# Patient Record
Sex: FEMALE | Race: BLACK OR AFRICAN AMERICAN | Employment: PART TIME | URBAN - METROPOLITAN AREA
[De-identification: names, ages, dates, MRNs, and addresses within clinical notes are randomized per-mention and may not be internally consistent; named-entity substitution may affect disease eponyms.]

---

## 2023-07-06 ENCOUNTER — HOSPITAL ENCOUNTER (OUTPATIENT)
Age: 68
Setting detail: SPECIMEN
Discharge: HOME | End: 2023-07-06

## 2023-07-06 LAB
BILIRUB UR QL STRIP: NEGATIVE
CASTS #/AREA URNS LPF: NORMAL /LPF (ref 0–2)
CASTS #/AREA URNS LPF: NORMAL /LPF (ref 0–2)
CLARITY UR: ABNORMAL
COLOR UR: ABNORMAL
EPI CELLS #/AREA URNS HPF: NORMAL /HPF (ref 0–5)
GLUCOSE UR STRIP-MCNC: NEGATIVE MG/DL
HGB UR QL STRIP.AUTO: ABNORMAL
KETONES UR STRIP-MCNC: NEGATIVE MG/DL
LEUKOCYTE ESTERASE UR QL STRIP: ABNORMAL
NITRITE UR QL STRIP: POSITIVE
PH UR STRIP: 5 [PH] (ref 5–8)
PROT UR STRIP-MCNC: ABNORMAL MG/DL
RBC #/AREA URNS HPF: NORMAL /HPF (ref 0–2)
SP GR UR STRIP: 1.02 (ref 1–1.03)
UROBILINOGEN UR STRIP-ACNC: NORMAL
WBC #/AREA URNS HPF: NORMAL /HPF (ref 0–5)

## 2023-07-07 LAB
MICROORGANISM SPEC CULT: NORMAL
SPECIMEN DESCRIPTION: NORMAL

## 2023-07-12 ENCOUNTER — HOSPITAL ENCOUNTER (OUTPATIENT)
Age: 68
Setting detail: SPECIMEN
Discharge: HOME | End: 2023-07-12

## 2023-07-12 LAB
25(OH)D3 SERPL-MCNC: 13 NG/ML
ALBUMIN SERPL-MCNC: 4.2 G/DL (ref 3.5–5.2)
ALBUMIN/GLOB SERPL: 1.6 {RATIO} (ref 1–2.5)
ALP SERPL-CCNC: 101 U/L (ref 35–104)
ALT SERPL-CCNC: 9 U/L (ref 5–33)
ANION GAP SERPL CALCULATED.3IONS-SCNC: 10 MMOL/L (ref 9–17)
AST SERPL-CCNC: 15 U/L
BASOPHILS # BLD: 0.06 K/UL (ref 0–0.2)
BASOPHILS NFR BLD: 1 % (ref 0–2)
BILIRUB SERPL-MCNC: 0.3 MG/DL (ref 0.3–1.2)
BUN SERPL-MCNC: 27 MG/DL (ref 8–23)
CALCIUM SERPL-MCNC: 9.6 MG/DL (ref 8.6–10.4)
CHLORIDE SERPL-SCNC: 109 MMOL/L (ref 98–107)
CHOLEST SERPL-MCNC: 149 MG/DL
CHOLESTEROL/HDL RATIO: 3.5
CO2 SERPL-SCNC: 23 MMOL/L (ref 20–31)
CREAT SERPL-MCNC: 1.2 MG/DL (ref 0.5–0.9)
EOSINOPHIL # BLD: 0.14 K/UL (ref 0–0.44)
EOSINOPHILS RELATIVE PERCENT: 1 % (ref 1–4)
ERYTHROCYTE [DISTWIDTH] IN BLOOD BY AUTOMATED COUNT: 14 % (ref 11.8–14.4)
FOLATE SERPL-MCNC: 11.8 NG/ML
GFR SERPL CREATININE-BSD FRML MDRD: 50 ML/MIN/1.73M2
GLUCOSE SERPL-MCNC: 86 MG/DL (ref 70–99)
HCT VFR BLD AUTO: 42.7 % (ref 36.3–47.1)
HDLC SERPL-MCNC: 43 MG/DL
HGB BLD-MCNC: 13 G/DL (ref 11.9–15.1)
IMM GRANULOCYTES # BLD AUTO: 0.03 K/UL (ref 0–0.3)
IMM GRANULOCYTES NFR BLD: 0 %
LDLC SERPL CALC-MCNC: 77 MG/DL (ref 0–130)
LYMPHOCYTES # BLD: 21 % (ref 24–43)
LYMPHOCYTES NFR BLD: 2.33 K/UL (ref 1.1–3.7)
MAGNESIUM SERPL-MCNC: 2.2 MG/DL (ref 1.6–2.6)
MCH RBC QN AUTO: 29.5 PG (ref 25.2–33.5)
MCHC RBC AUTO-ENTMCNC: 30.4 G/DL (ref 28.4–34.8)
MCV RBC AUTO: 97 FL (ref 82.6–102.9)
MONOCYTES NFR BLD: 0.78 K/UL (ref 0.1–1.2)
MONOCYTES NFR BLD: 7 % (ref 3–12)
NEUTROPHILS NFR BLD: 70 % (ref 36–65)
NEUTS SEG NFR BLD: 7.63 K/UL (ref 1.5–8.1)
NRBC BLD-RTO: 0 PER 100 WBC
PLATELET # BLD AUTO: 197 K/UL (ref 138–453)
PMV BLD AUTO: 11.4 FL (ref 8.1–13.5)
POTASSIUM SERPL-SCNC: 4.8 MMOL/L (ref 3.7–5.3)
PROT SERPL-MCNC: 6.9 G/DL (ref 6.4–8.3)
RBC # BLD AUTO: 4.4 M/UL (ref 3.95–5.11)
SODIUM SERPL-SCNC: 142 MMOL/L (ref 135–144)
TRIGL SERPL-MCNC: 145 MG/DL
TSH SERPL DL<=0.05 MIU/L-ACNC: 0.52 UIU/ML (ref 0.3–5)
VIT B12 SERPL-MCNC: 457 PG/ML (ref 232–1245)
WBC OTHER # BLD: 11 K/UL (ref 3.5–11.3)

## 2023-08-29 ENCOUNTER — TELEPHONE (OUTPATIENT)
Dept: NEPHROLOGY | Age: 68
End: 2023-08-29

## 2023-08-29 ENCOUNTER — OFFICE VISIT (OUTPATIENT)
Dept: NEPHROLOGY | Age: 68
End: 2023-08-29
Payer: MEDICARE

## 2023-08-29 VITALS
OXYGEN SATURATION: 94 % | SYSTOLIC BLOOD PRESSURE: 124 MMHG | DIASTOLIC BLOOD PRESSURE: 76 MMHG | HEART RATE: 70 BPM | WEIGHT: 202 LBS | BODY MASS INDEX: 32.47 KG/M2 | HEIGHT: 66 IN

## 2023-08-29 DIAGNOSIS — N28.89 RIGHT RENAL MASS: ICD-10-CM

## 2023-08-29 DIAGNOSIS — R93.41 ABNORMAL RADIOLOGIC FINDINGS ON DIAGNOSTIC IMAGING OF RENAL PELVIS, URETER, OR BLADDER: ICD-10-CM

## 2023-08-29 DIAGNOSIS — N18.31 STAGE 3A CHRONIC KIDNEY DISEASE (HCC): Primary | ICD-10-CM

## 2023-08-29 DIAGNOSIS — I10 PRIMARY HYPERTENSION: ICD-10-CM

## 2023-08-29 DIAGNOSIS — N31.2 ATONIC BLADDER: ICD-10-CM

## 2023-08-29 PROBLEM — M19.90 ARTHRITIS: Status: ACTIVE | Noted: 2019-01-29

## 2023-08-29 PROBLEM — N30.10 CHRONIC INTERSTITIAL CYSTITIS: Status: ACTIVE | Noted: 2023-08-29

## 2023-08-29 PROBLEM — R40.0 DAYTIME SOMNOLENCE: Status: ACTIVE | Noted: 2023-08-29

## 2023-08-29 PROBLEM — E66.9 OBESITY WITH BODY MASS INDEX 30 OR GREATER: Status: ACTIVE | Noted: 2023-08-29

## 2023-08-29 PROBLEM — M79.10 MUSCLE PAIN: Status: ACTIVE | Noted: 2023-08-29

## 2023-08-29 PROBLEM — F17.200 TOBACCO DEPENDENCE SYNDROME: Status: ACTIVE | Noted: 2022-02-02

## 2023-08-29 PROBLEM — R09.81 NASAL CONGESTION: Status: ACTIVE | Noted: 2023-08-29

## 2023-08-29 PROBLEM — N32.81 OVERACTIVE BLADDER: Status: ACTIVE | Noted: 2022-05-04

## 2023-08-29 PROBLEM — R52 GENERALIZED PAIN: Status: ACTIVE | Noted: 2020-12-08
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
## 2023-08-29 PROBLEM — R40.1 CLOUDED CONSCIOUSNESS: Status: ACTIVE | Noted: 2023-08-29

## 2023-08-29 PROBLEM — R61 NIGHT SWEATS: Status: ACTIVE | Noted: 2023-08-29

## 2023-08-29 PROBLEM — R07.9 CHEST PAIN: Status: ACTIVE | Noted: 2017-03-13

## 2023-08-29 PROBLEM — H54.7 WORSENING VISION: Status: ACTIVE | Noted: 2019-05-22

## 2023-08-29 PROBLEM — M54.50 LOW BACK PAIN: Status: ACTIVE | Noted: 2017-06-09

## 2023-08-29 PROBLEM — E66.9 SIMPLE OBESITY: Status: ACTIVE | Noted: 2019-03-01

## 2023-08-29 PROBLEM — K57.32 DIVERTICULITIS OF COLON: Status: ACTIVE | Noted: 2019-03-01

## 2023-08-29 PROBLEM — R51.9 MORNING HEADACHE: Status: ACTIVE | Noted: 2023-08-29

## 2023-08-29 PROBLEM — N62 HYPERTROPHY OF BREAST: Status: ACTIVE | Noted: 2021-04-09

## 2023-08-29 PROBLEM — R68.89 ITCHY EYES: Status: ACTIVE | Noted: 2019-05-22

## 2023-08-29 PROBLEM — G25.81 RESTLESS LEGS SYNDROME: Status: ACTIVE | Noted: 2023-08-29

## 2023-08-29 PROBLEM — M81.0 OSTEOPOROSIS: Status: ACTIVE | Noted: 2018-03-21

## 2023-08-29 PROBLEM — L25.9 CONTACT DERMATITIS: Status: ACTIVE | Noted: 2021-07-02

## 2023-08-29 PROBLEM — R68.83 CHILLS: Status: ACTIVE | Noted: 2020-12-08

## 2023-08-29 PROBLEM — N95.0 POSTMENOPAUSAL BLEEDING: Status: ACTIVE | Noted: 2020-09-09

## 2023-08-29 PROBLEM — D23.9: Status: ACTIVE | Noted: 2022-02-02

## 2023-08-29 PROBLEM — R87.610 ATYPICAL SQUAMOUS CELLS OF UNDETERMINED SIGNIFICANCE (ASCUS) ON PAPANICOLAOU SMEAR OF CERVIX: Status: ACTIVE | Noted: 2023-08-29

## 2023-08-29 PROBLEM — H65.90 SEROUS OTITIS MEDIA: Status: ACTIVE | Noted: 2021-03-10

## 2023-08-29 PROBLEM — L63.9 ALOPECIA AREATA, UNSPECIFIED: Status: ACTIVE | Noted: 2017-03-13

## 2023-08-29 PROBLEM — H61.20 IMPACTED CERUMEN: Status: ACTIVE | Noted: 2020-03-02

## 2023-08-29 PROBLEM — R44.2 HYPNAGOGIC HALLUCINATIONS: Status: ACTIVE | Noted: 2023-08-29

## 2023-08-29 PROBLEM — N93.9 ABNORMAL UTERINE BLEEDING: Status: ACTIVE | Noted: 2021-02-10

## 2023-08-29 PROBLEM — R06.83 SNORING: Status: ACTIVE | Noted: 2023-08-29

## 2023-08-29 PROBLEM — G47.30 SLEEP APNEA: Status: ACTIVE | Noted: 2021-10-13

## 2023-08-29 PROBLEM — G47.00 INSOMNIA: Status: ACTIVE | Noted: 2023-08-29

## 2023-08-29 PROBLEM — H57.9 ITCHY EYES: Status: ACTIVE | Noted: 2019-05-22

## 2023-08-29 PROBLEM — F45.8 PSYCHOGENIC DYSURIA: Status: ACTIVE | Noted: 2020-06-29

## 2023-08-29 PROBLEM — I25.2 HISTORY OF MYOCARDIAL INFARCTION: Status: ACTIVE | Noted: 2023-08-29

## 2023-08-29 PROBLEM — K11.7 XEROSTOMIA: Status: ACTIVE | Noted: 2023-08-29

## 2023-08-29 PROBLEM — F43.22 ADJUSTMENT DISORDER WITH ANXIOUS MOOD: Status: ACTIVE | Noted: 2017-06-07

## 2023-08-29 PROCEDURE — 1123F ACP DISCUSS/DSCN MKR DOCD: CPT | Performed by: INTERNAL MEDICINE

## 2023-08-29 PROCEDURE — 99204 OFFICE O/P NEW MOD 45 MIN: CPT | Performed by: INTERNAL MEDICINE

## 2023-08-29 PROCEDURE — 3078F DIAST BP <80 MM HG: CPT | Performed by: INTERNAL MEDICINE

## 2023-08-29 PROCEDURE — 3074F SYST BP LT 130 MM HG: CPT | Performed by: INTERNAL MEDICINE

## 2023-08-29 RX ORDER — OXYBUTYNIN CHLORIDE 5 MG/1
5 TABLET, EXTENDED RELEASE ORAL DAILY
COMMUNITY

## 2023-08-29 RX ORDER — ATORVASTATIN CALCIUM 40 MG/1
40 TABLET, FILM COATED ORAL DAILY
COMMUNITY

## 2023-08-29 NOTE — PATIENT INSTRUCTIONS
Drugs to Avoid with Chronic Kidney Disease    Non-steroidal anti-inflammatory drugs (NSAIDS)    Potential complication and side effects of NSAIDS include:  Kidney injury and worsening kidney function   Risk of stomach ulcer and intestinal bleeding  Fluid retention and edema  Increased Blood Pressure    Non-Steroidal Anti-Inflammatory Drugs    Celecoxib (Celebrex)  Choline and magnesium salicylates (CMT, Trisosal, Trilisate)  Choline salicylate (Arthropan)  Diclofenac (Voltaren, Arthrotec, Cataflam, Cambia, Voltaren-XR, Zipsor)  Diflunisal (Dolobid)  Etodolac (Lodine XL, Lodine)  Famotidine + Ibuprofen (Duexis)  Fenoprofen (Nalfon, Nalfon 200)  Furbiprofen (Asaid)  Ibuprofen (Advil, Motrin, Midol, Nuprin, Genpril, Dolgesic,Profen,, Vicoprofen,Combunox, Actiprofen, Addaprin, Caldolor, Haltran, Q-Profen,Ibren, Menadol, Rufen,Saleto-200, Union,Ultraprin, Uni-Pro,Wal-Profen)  Indomethacin (Indocin, Indomethegan, Indo-Melvi)   Ketoprofen (Orudis  KT, Oruvail, Actron)  Ketorolac (Toradol, Sprix)  Magnesium Sulfate (Arthritab, Eagle Select, Tee's pills, Elio, Mobidin, Mobogesic)  Meclofenamate Sodium (Ponstel)  Meloxicam (Mobic)  Naproxen (Aleve, Anaprox, Naprosyn, EC-Naprosyn, Naprelan, All Day Pain Relief, Aflaxen, Anaprox-DS, Midol Extended Relief, Naprelan,Prevacid NapraPac, Naprapac, Vimovo)  Nabumetone (Relafen)  Oxaprozin (Daypro)  Piroxicam (Feldene)  Rofecoxib (Vioxx)  Salsalate (Amigesic, Anaflex 750, Disalcid, Marthritic, Mono-gesic, Salflex, Salsitab)  Sodium salicylate (various generics)  Sulindac (Clinoril)  Tolmetin (Tolectin)  Valdecoxib (Bextra)  Mefenamic Acid (Ponstel)  Famotidine and Ibuprofen (Duexis) but not famotidine by itself  Meclofenamate (Meclomen)    Antibiotics  Bactrim  Gentamycin  Tobramycin  Vancomycin  Tetracycline  Macrobid    Other                  IV contrast dye

## 2023-08-29 NOTE — TELEPHONE ENCOUNTER
[facility]. Arrive at 9:45 am fast for 4 hours this is at [address] [address].

## 2023-08-29 NOTE — TELEPHONE ENCOUNTER
I left a message for [employer] about the information below and asked for a return call.

## 2023-08-29 NOTE — TELEPHONE ENCOUNTER
I spoke to [facility] for 12 min.she gave me another number to call for PA she said that Savanah Campbell does them. I have to call [phone].

## 2023-08-29 NOTE — PROGRESS NOTES
Patient stated after I reviewed her medications that she has not been taking anything.
moist.  Neck: no bruits or jvd noted **  Cardiovascular:  S1, S2 without murmur **  Respiratory: Clear with no wheezes,rhonchi or rales   Abdomen: soft,non tender,good bowel sound and no palpable mass**  Ext: No lower extremity edema  Psychiatric: mood and affect appropriate**  Musculoskeletal:  Rom, muscular strength intact        CNS: Very awake and very alert. Well-oriented. Normal speech. Good motor strength. No obvious focal deficit. Data:   Labs:  Lab Results   Component Value Date     07/12/2023     10/13/2021     (H) 07/12/2021    K 4.8 07/12/2023    K 4.5 10/13/2021    K 4.6 07/12/2021     (H) 07/12/2023    CO2 23 07/12/2023    CO2 23 10/13/2021    CO2 22 07/12/2021    CREATININE 1.2 (H) 07/12/2023    CREATININE 0.96 (H) 10/13/2021    CREATININE 1.01 (H) 07/12/2021    BUN 27 (H) 07/12/2023    BUN 16 10/13/2021    BUN 22 07/12/2021    GLUCOSE 86 07/12/2023    GLUCOSE 96 10/13/2021    GLUCOSE 94 07/12/2021    WBC 11.0 07/12/2023    WBC 9.9 10/13/2021    WBC 10.7 07/12/2021    HGB 13.0 07/12/2023    HGB 13.3 10/13/2021    HGB 12.2 07/12/2021    HCT 42.7 07/12/2023    HCT 45.6 10/13/2021    HCT 42.7 07/12/2021    MCV 97.0 07/12/2023     07/12/2023         Imaging:  CXR results: Diagnostic images reports reviewed         Thank you Dr. Cheryl Grant, APRN - NP for allowing us to participate in care of Yony Mcbride   **This report has been created using voice recognition software. It maycontain minor  errors which are inherent in voice recognition technology. **    Electronically signed by Anjum Tran MD on 8/29/2023 at 9:44 AM

## 2023-09-07 ENCOUNTER — OFFICE VISIT (OUTPATIENT)
Dept: NEPHROLOGY | Age: 68
End: 2023-09-07
Payer: MEDICARE

## 2023-09-07 ENCOUNTER — TELEPHONE (OUTPATIENT)
Dept: NEPHROLOGY | Age: 68
End: 2023-09-07

## 2023-09-07 VITALS
BODY MASS INDEX: 32.78 KG/M2 | WEIGHT: 204 LBS | SYSTOLIC BLOOD PRESSURE: 129 MMHG | HEIGHT: 66 IN | HEART RATE: 79 BPM | DIASTOLIC BLOOD PRESSURE: 73 MMHG | OXYGEN SATURATION: 95 %

## 2023-09-07 DIAGNOSIS — N28.89 LEFT RENAL MASS: ICD-10-CM

## 2023-09-07 DIAGNOSIS — N18.31 STAGE 3A CHRONIC KIDNEY DISEASE (HCC): Primary | ICD-10-CM

## 2023-09-07 DIAGNOSIS — N31.2 ATONIC BLADDER: ICD-10-CM

## 2023-09-07 DIAGNOSIS — I10 PRIMARY HYPERTENSION: ICD-10-CM

## 2023-09-07 PROCEDURE — 3074F SYST BP LT 130 MM HG: CPT | Performed by: INTERNAL MEDICINE

## 2023-09-07 PROCEDURE — 1123F ACP DISCUSS/DSCN MKR DOCD: CPT | Performed by: INTERNAL MEDICINE

## 2023-09-07 PROCEDURE — 99214 OFFICE O/P EST MOD 30 MIN: CPT | Performed by: INTERNAL MEDICINE

## 2023-09-07 PROCEDURE — 3078F DIAST BP <80 MM HG: CPT | Performed by: INTERNAL MEDICINE

## 2023-09-07 NOTE — TELEPHONE ENCOUNTER
----- Message from Sabiha Smith MD sent at 9/7/2023 12:49 PM EDT -----  Need to see the patient sooner
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
## 2023-09-07 NOTE — TELEPHONE ENCOUNTER
Pt called asking if her urology new patient appointment has been scheduled. I informed her that I faxed the referral. Lissa Lynn the phone number to call Urology. [address] [place] [address] Urology and spoke with Cookie Sims. Dr. Jesus Balbuena would like Jeffory Gitelman seen ASAP due to a right renal mass. Cookie Sims requests we fax a referral and assured me they will get her scheduled as soon as they receive it. I requested on the referral that they let us know which Doctor she is scheduled with so that Dr. Jesus Balbuena may speak with them prior to [facility] appointment.    Referral faxed to [phone]

## 2023-09-13 NOTE — TELEPHONE ENCOUNTER
I called the urology office to get [employer] scheduled ASAP. I had to hang up. If they call back please let them know she needs seen ASAP and the doctor that is going to see you Dr. Ike Wick would like to speak to before she is seen. So please give them Dr. Figueroa Room cell number.

## 2023-09-14 ENCOUNTER — OFFICE VISIT (OUTPATIENT)
Dept: UROLOGY | Age: 68
End: 2023-09-14
Payer: MEDICARE

## 2023-09-14 VITALS
SYSTOLIC BLOOD PRESSURE: 130 MMHG | WEIGHT: 203.6 LBS | HEIGHT: 66 IN | BODY MASS INDEX: 32.72 KG/M2 | DIASTOLIC BLOOD PRESSURE: 82 MMHG

## 2023-09-14 DIAGNOSIS — N28.89 LEFT RENAL MASS: Primary | ICD-10-CM

## 2023-09-14 DIAGNOSIS — N32.81 OAB (OVERACTIVE BLADDER): ICD-10-CM

## 2023-09-14 PROCEDURE — 1123F ACP DISCUSS/DSCN MKR DOCD: CPT | Performed by: UROLOGY

## 2023-09-14 PROCEDURE — 99204 OFFICE O/P NEW MOD 45 MIN: CPT | Performed by: UROLOGY

## 2023-09-14 PROCEDURE — 3079F DIAST BP 80-89 MM HG: CPT | Performed by: UROLOGY

## 2023-09-14 PROCEDURE — 3075F SYST BP GE 130 - 139MM HG: CPT | Performed by: UROLOGY

## 2023-09-14 NOTE — PROGRESS NOTES
Eliel Nugent MD.    [address]  [address] [address]  Dept: [phone]  Dept Fax: [phone]  Loc: [facility] Urology Office Note -     Patient:  Moisés FIERRO  YOB: 1955    The patient is a 80 y.o. female who presents today for evaluation of the following problems:   Chief Complaint   Patient presents with    New Patient     Left renal mass    referred/consultation requested by MYKEL Santiago NP. History of Present Illness:    Left renal mass  Reviewed MRI and CT report  No images seen  4.5 cm enhancing renal mass    OAB  On ditropan  stable      Requested/reviewed records from MYKEL Santiago NP office and/or outside physician/EMR    (Patient's old records have been requested, reviewed and pertinent findings summarized in today's note.)    Procedures Today: N/A      Last several PSA's:  No results found for: \"PSA\"    Last total testosterone:  No results found for: \"TESTOSTERONE\"    Urinalysis today:  No results found for this visit on 09/14/23. Last BUN and creatinine:  Lab Results   Component Value Date    BUN 27 (H) 07/12/2023     Lab Results   Component Value Date    CREATININE 1.2 (H) 07/12/2023         Imaging Reviewed during this Office Visit:   Jessica Noyola MD independently reviewed the images and verified the radiology reports from:      MRI    Arising from the lateral and     inferior left kidney, there is a lobular solid mass measuring 3.8 x 3.3 x     4.6 cm (AP by transverse by craniocaudal). The mass is not significantly     changed when compared side by side.       PAST MEDICAL, FAMILY AND SOCIAL HISTORY:  Past Medical History:   Diagnosis Date    Arthritis     Chest pain     GERD (gastroesophageal reflux disease)     Hypertension     Kidney stones     Migraine     migraines     Past Surgical History:   Procedure Laterality Date

## 2023-10-05 ENCOUNTER — OFFICE VISIT (OUTPATIENT)
Dept: UROLOGY | Age: 68
End: 2023-10-05
Payer: MEDICARE

## 2023-10-05 VITALS — RESPIRATION RATE: 16 BRPM | HEIGHT: 66 IN | BODY MASS INDEX: 32.62 KG/M2 | WEIGHT: 203 LBS

## 2023-10-05 DIAGNOSIS — N32.81 OAB (OVERACTIVE BLADDER): ICD-10-CM

## 2023-10-05 DIAGNOSIS — N28.89 LEFT RENAL MASS: Primary | ICD-10-CM

## 2023-10-05 PROCEDURE — 99215 OFFICE O/P EST HI 40 MIN: CPT | Performed by: UROLOGY

## 2023-10-05 PROCEDURE — 1123F ACP DISCUSS/DSCN MKR DOCD: CPT | Performed by: UROLOGY

## 2023-10-05 NOTE — PROGRESS NOTES
outcomes and complications of the above options. Discussed 20% chance of benign disease  I answered all the patient's questions. For partial nephrectomy, I discussed risk of bleeding, conversion to radical nephrectomy or conversion to open approach. The patient would like to proceed with surgery. Will arrange for Robotic partial LEFT nephrectomy, possible radical, possible open. Oab- stable. Cont ditropan          Prescriptions Ordered:  No orders of the defined types were placed in this encounter. Orders Placed:  No orders of the defined types were placed in this encounter.            James Seals MD

## 2023-10-06 ENCOUNTER — HOSPITAL ENCOUNTER (OUTPATIENT)
Dept: MRI IMAGING | Age: 68
Discharge: HOME | End: 2023-10-06
Attending: RADIOLOGY

## 2023-10-06 ENCOUNTER — HOSPITAL ENCOUNTER (OUTPATIENT)
Dept: ULTRASOUND IMAGING | Age: 68
Discharge: HOME | End: 2023-10-06
Attending: RADIOLOGY

## 2023-10-06 ENCOUNTER — HOSPITAL ENCOUNTER (OUTPATIENT)
Dept: CT IMAGING | Age: 68
Discharge: HOME | End: 2023-10-06
Attending: RADIOLOGY

## 2023-10-06 DIAGNOSIS — Z00.6 EXAMINATION FOR NORMAL COMPARISON FOR CLINICAL RESEARCH: ICD-10-CM

## 2023-10-11 ENCOUNTER — TELEMEDICINE (OUTPATIENT)
Dept: UROLOGY | Age: 68
End: 2023-10-11
Payer: MEDICARE

## 2023-10-11 DIAGNOSIS — N32.81 OAB (OVERACTIVE BLADDER): ICD-10-CM

## 2023-10-11 DIAGNOSIS — N28.89 LEFT RENAL MASS: Primary | ICD-10-CM

## 2023-10-11 PROCEDURE — 99443 PR PHYS/QHP TELEPHONE EVALUATION 21-30 MIN: CPT | Performed by: UROLOGY

## 2023-10-11 NOTE — PROGRESS NOTES
[address] [place] [address]  Dept: [phone]  Dept Fax: [phone]  Loc: [facility] Urology Office Note -     Patient:  Harjeet FIERRO  YOB: 1955    The patient is a 80 y.o. female who presents today for evaluation of the following problems:   Chief Complaint   Patient presents with    Other     Left renal mass. Surgery and imaging discussion    referred/consultation requested by MYKEL Buck NP. History of Present Illness:    Left renal mass  Reviewed MRI and CT report  No images seen  4.5 cm enhancing renal mass  Possibly seen in 2020    OAB  On ditropan  stable      Requested/reviewed records from Gwendolyn Alfred APRN - NP office and/or outside physician/EMR    (Patient's old records have been requested, reviewed and pertinent findings summarized in today's note.)    Procedures Today: N/A      Last several PSA's:  No results found for: \"PSA\"    Last total testosterone:  No results found for: \"TESTOSTERONE\"    Urinalysis today:  No results found for this visit on 10/11/23. Last BUN and creatinine:  Lab Results   Component Value Date    BUN 27 (H) 07/12/2023     Lab Results   Component Value Date    CREATININE 1.2 (H) 07/12/2023         Imaging Reviewed during this Office Visit:   Shantel Del Valle MD independently reviewed the images and verified the radiology reports from:      MRI    Arising from the lateral and     inferior left kidney, there is a lobular solid mass measuring 3.8 x 3.3 x     4.6 cm (AP by transverse by craniocaudal). The mass is not significantly     changed when compared side by side.       PAST MEDICAL, FAMILY AND SOCIAL HISTORY:  Past Medical History:   Diagnosis Date    Arthritis     Chest pain     GERD (gastroesophageal reflux disease)     Hypertension     Kidney stones     Migraine     migraines     Past Surgical

## 2023-10-13 ENCOUNTER — TELEPHONE (OUTPATIENT)
Dept: UROLOGY | Age: 68
End: 2023-10-13

## 2023-10-13 DIAGNOSIS — Z01.818 PRE-OP TESTING: ICD-10-CM

## 2023-10-13 DIAGNOSIS — N28.89 LEFT RENAL MASS: Primary | ICD-10-CM

## 2023-10-13 NOTE — TELEPHONE ENCOUNTER
DO NOT TAKE  FISH OIL, MOBIC, IBUPROFEN, MOTRIN-LIKE DRUGS AND ANY MULTIVITAMINS OR OVER THE COUNTER SUPPLEMENTS 14 DAYS PRIOR TO SURGERY. HOLD ASPIRIN 5 DAYS PRIOR TO SURGERY    IF YOU TAKE GLUCOPHAGE, METFORMIN OR JANUMET, HOLD 2 DAYS PRIOR TO SURGERY    MUST HAVE AN ADULT OVER THE AGE OF 18 WITH YOU AT THE TIME OF THE DISCHARGE AND WITH YOU AT HOME AFTER THE PROCEDURE FOR 24 HOURS        Martinezmendoza [facility] 1955     Surgical Physician: Dr. Leslie Quintanilla have been scheduled for the procedure marked below:      Surgery: Robotic Assisted Laparoscopic Left Partial Nephrectomy, Possible Open, Possible Radical          Date: 11/9/23     Anesthesia: Anesthesiologist (General/Spinal)     Place of Service: [facility]         Arrive to same day surgery at:  6:00 am  (Surgery time is subject to change)      INSTRUCTIONS AS MARKED BELOW:    1.  DO NOT eat or drink anything after midnight before surgery. 2.  We prefer you shower or bathe with an antibacterial soap (Dial) the morning of surgery. 3.  Plan to spend the night at the hospital the day of surgery. 4  Please bring a current medication list, photo ID and insurance card(s) with you  5. Okay to take Tylenol  6. Take blood pressure or heart medication as directed, if taken in the morning take with a small sip of water  7. Start the bowel prep the day before surgery on 11/8/23. Instructions included  8. The office will call you in 1-2 days after your procedure to schedule a follow up.  [address] [place] [address] JOSSELIN will assist with your Robotic surgery    DATE SENSITIVE TESTING-DO ON THE DATE LISTED *WALK IN *NO APPOINTMENT    DO THE PRE OP URINE CULTURE AND FASTING LABS ON 10/30/23.  ORDERS INCLUDED AND IN EPIC        Date: 10/13/2023

## 2023-10-13 NOTE — TELEPHONE ENCOUNTER
[address] [place] [address] Urology  [address]  [facility], [address] [place] [address]  [phone]    START BOWEL PREP ON 11/8/23    Surgery Bowel Preparation    Purchase a 4.1 oz bottle of Miralax at your pharmacy which will be in powder form. Follow the instructions as directed for mixing with water and drink the entire bottle, Take 4 Dulcolax tablets the afternoon before your scheduled surgery. Start drinking approximately 2:00pm.       Start a clear liquid diet (for dinner) the evening before surgery. You may have the following:   Water   Apple, grape or cranberry juice   Clear, fat free broth   Clear sodas (7-up, Sprite)   Plain gelatin (Jell-o)   Popsicles without bits of fruit or fruit pulp   Tea or coffee without milk or cream    Nothing to eat or drink after midnight before your scheduled surgery. Do a fleets enema the night prior to your surgery between 7:00pm and 8:00pm    Please contact our office at [phone] if you have any questions.

## 2023-10-13 NOTE — TELEPHONE ENCOUNTER
Patient is scheduled for surgery with  on 11/9/23. Surgery consent to be done on arrival. Dr. Efrain Lyons to clear. Patient to do pre op urine culture and fasting labs on 10/30/23. Surgery instructions gone over with patient verbally in the office or mailed to the patient. Patient informed an adult over the age of 25 must be with them at the time of surgery, upon discharge and at home for 24 hours after the procedure.

## 2023-10-13 NOTE — TELEPHONE ENCOUNTER
Robotic Surgery Scheduling Form   [facility] [address] [facility]    Phone * [phone]   Surgical Scheduling Direct line Phone * [phone]  Fax * [phone]      Cale MCLEODLuanaBETINA      1955    female    [address]  [facility] [address]  Marital Status:          Home Phone: [phone]   Cell Phone:   Telephone Information:   Mobile [phone]              Surgeon: Dr. Christopher Appiah            Surgery Date:11/9/23           Time: 7:30 am     Procedure: Robotic Assisted Lapar scopic Left Partial Nephrectomy, Possible Radical, Possible Open        Outpatient/OBS     Diagnosis: Left Renal Mass    Important Medical History: IN EPIC    Special Inst/Equip: XI    CPT Codes: 34906    Latex Allergy:   Yes Cardiac Device:  No    Case Location:  Main OR     Preadmission Testing:  Phone Call    PAT Date and Time: ________________________________    PAT Confirmation #: _________________________________    Post Op Visit:  ______________________________________    Need Preop Cardiac Clearance:   Yes    Does patient have Cardiologist/physician?   Dr Angie Kevin #:  ______________________________________________    Odilia Raleigh: __________________________________ Date:____________________    Firefly:  No    Dual Console:  No   Ultrasound:  No    Single Site: No    RNFA (colon resection only):  Assisting Surgeon: [address] Name:  [facility]

## 2023-10-19 ENCOUNTER — TELEPHONE (OUTPATIENT)
Dept: UROLOGY | Age: 68
End: 2023-10-19

## 2023-10-19 DIAGNOSIS — N28.89 LEFT RENAL MASS: Primary | ICD-10-CM

## 2023-10-19 NOTE — TELEPHONE ENCOUNTER
Patient cancelling upcoming surgery with Dr Simi Russell on 11/9/23.  We are do a biopsy with IR first.    OR notified

## 2023-11-06 ENCOUNTER — HOSPITAL ENCOUNTER (OUTPATIENT)
Age: 68
Setting detail: SPECIMEN
Discharge: HOME | End: 2023-11-06

## 2023-11-06 ENCOUNTER — HOSPITAL ENCOUNTER (OUTPATIENT)
Dept: INTERVENTIONAL RADIOLOGY/VASCULAR | Age: 68
Discharge: HOME | End: 2023-11-08
Payer: MEDICARE

## 2023-11-06 DIAGNOSIS — I82.492 ACUTE DEEP VEIN THROMBOSIS (DVT) OF OTHER SPECIFIED VEIN OF LEFT LOWER EXTREMITY (HCC): ICD-10-CM

## 2023-11-06 PROCEDURE — 93971 EXTREMITY STUDY: CPT

## 2023-11-08 LAB
MICROORGANISM SPEC CULT: ABNORMAL
SPECIMEN DESCRIPTION: ABNORMAL

## 2023-11-10 ENCOUNTER — HOSPITAL ENCOUNTER (OUTPATIENT)
Dept: CT IMAGING | Age: 68
Discharge: HOME | End: 2023-11-10
Payer: MEDICARE

## 2023-11-10 VITALS
BODY MASS INDEX: 32.14 KG/M2 | TEMPERATURE: 97.3 F | HEIGHT: 66 IN | RESPIRATION RATE: 16 BRPM | HEART RATE: 72 BPM | SYSTOLIC BLOOD PRESSURE: 135 MMHG | OXYGEN SATURATION: 92 % | DIASTOLIC BLOOD PRESSURE: 85 MMHG | WEIGHT: 200 LBS

## 2023-11-10 DIAGNOSIS — N28.89 LEFT RENAL MASS: ICD-10-CM

## 2023-11-10 LAB
CREAT SERPL-MCNC: 1 MG/DL (ref 0.4–1.2)
DEPRECATED RDW RBC AUTO: 49.3 FL (ref 35–45)
ERYTHROCYTE [DISTWIDTH] IN BLOOD BY AUTOMATED COUNT: 14.1 % (ref 11.5–14.5)
GFR SERPL CREATININE-BSD FRML MDRD: > 60 ML/MIN/1.73M2
HCT VFR BLD AUTO: 43.4 % (ref 37–47)
HGB BLD-MCNC: 13.2 GM/DL (ref 12–16)
MCH RBC QN AUTO: 29 PG (ref 26–33)
MCHC RBC AUTO-ENTMCNC: 30.4 GM/DL (ref 32.2–35.5)
MCV RBC AUTO: 95.4 FL (ref 81–99)
PLATELET # BLD AUTO: 188 THOU/MM3 (ref 130–400)
PMV BLD AUTO: 10.8 FL (ref 9.4–12.4)
RBC # BLD AUTO: 4.55 MILL/MM3 (ref 4.2–5.4)
WBC # BLD AUTO: 7.4 THOU/MM3 (ref 4.8–10.8)

## 2023-11-10 PROCEDURE — 36415 COLL VENOUS BLD VENIPUNCTURE: CPT

## 2023-11-10 PROCEDURE — 50200 RENAL BIOPSY PERQ: CPT

## 2023-11-10 PROCEDURE — 82565 ASSAY OF CREATININE: CPT

## 2023-11-10 PROCEDURE — 77012 CT SCAN FOR NEEDLE BIOPSY: CPT

## 2023-11-10 PROCEDURE — 85027 COMPLETE CBC AUTOMATED: CPT

## 2023-11-10 PROCEDURE — 6360000002 HC RX W HCPCS: Performed by: RADIOLOGY

## 2023-11-10 PROCEDURE — 2580000003 HC RX 258: Performed by: RADIOLOGY

## 2023-11-10 RX ORDER — SODIUM CHLORIDE 450 MG/100ML
INJECTION, SOLUTION INTRAVENOUS CONTINUOUS
Status: DISCONTINUED | OUTPATIENT
Start: 2023-11-10 | End: 2023-11-11 | Stop reason: HOSPADM

## 2023-11-10 RX ORDER — MIDAZOLAM HYDROCHLORIDE 1 MG/ML
INJECTION INTRAMUSCULAR; INTRAVENOUS PRN
Status: COMPLETED | OUTPATIENT
Start: 2023-11-10 | End: 2023-11-10

## 2023-11-10 RX ORDER — MIDAZOLAM HYDROCHLORIDE 1 MG/ML
1 INJECTION INTRAMUSCULAR; INTRAVENOUS ONCE
Status: DISCONTINUED | OUTPATIENT
Start: 2023-11-10 | End: 2023-11-11 | Stop reason: HOSPADM

## 2023-11-10 RX ORDER — FENTANYL CITRATE 50 UG/ML
50 INJECTION, SOLUTION INTRAMUSCULAR; INTRAVENOUS ONCE
Status: DISCONTINUED | OUTPATIENT
Start: 2023-11-10 | End: 2023-11-11 | Stop reason: HOSPADM

## 2023-11-10 RX ORDER — FENTANYL CITRATE 50 UG/ML
INJECTION, SOLUTION INTRAMUSCULAR; INTRAVENOUS PRN
Status: COMPLETED | OUTPATIENT
Start: 2023-11-10 | End: 2023-11-10

## 2023-11-10 RX ADMIN — SODIUM CHLORIDE: 4.5 INJECTION, SOLUTION INTRAVENOUS at 08:55

## 2023-11-10 RX ADMIN — MIDAZOLAM 1 MG: 1 INJECTION INTRAMUSCULAR; INTRAVENOUS at 10:17

## 2023-11-10 RX ADMIN — FENTANYL CITRATE 50 MCG: 50 INJECTION, SOLUTION INTRAMUSCULAR; INTRAVENOUS at 10:22

## 2023-11-10 RX ADMIN — FENTANYL CITRATE 50 MCG: 50 INJECTION, SOLUTION INTRAMUSCULAR; INTRAVENOUS at 10:17

## 2023-11-10 RX ADMIN — MIDAZOLAM 1 MG: 1 INJECTION INTRAMUSCULAR; INTRAVENOUS at 10:22

## 2023-11-10 ASSESSMENT — PAIN SCALES - GENERAL: PAINLEVEL_OUTOF10: 0

## 2023-11-10 NOTE — H&P
[facility]  Sedation/Analgesia History & Physical    Pt Name: Jose Ybarra  MRN: [id number]  YOB: 1955  Provider Performing Procedure: Antonio Montelongo MD, MD  Primary Care Physician: MYKEL Pereyra NP    Formulation and discussion of sedation / procedure plans, risks, benefits, side effects and alternatives with patient and/or responsible adult completed. PRE-PROCEDURE   DNR-CCA/DNR-CC []Yes [x]No  Brief History/Pre-Procedure Diagnosis: Left renal mass          MEDICAL HISTORY  []CAD/Valve  []Liver Disease  []Lung Disease []Diabetes  []Hypertension []Renal Disease  [x]Additional information:       has a past medical history of Arthritis, Chest pain, GERD (gastroesophageal reflux disease), Hypertension, Kidney stones, and Migraine. SURGICAL HISTORY   has a past surgical history that includes Appendectomy;  section; Finger trigger release; Knee arthroscopy; Upper gastrointestinal endoscopy; and Colonoscopy.   Additional information:       ALLERGIES   Allergies as of 11/10/2023 - Fully Reviewed 11/10/2023   Allergen Reaction Noted    Latex Hives 2021    Codeine Hives 2012    Hydrocodone-acetaminophen  2023     Additional information:       MEDICATIONS   Coumadin Use Last 5 Days [x]No []Yes  Antiplatelet drug therapy use last 5 days  [x]No []Yes  Other anticoagulant use last 5 days  [x]No []Yes    Current Outpatient Medications:     atorvastatin (LIPITOR) 40 MG tablet, Take 1 tablet by mouth daily, Disp: , Rfl:     oxybutynin (DITROPAN-XL) 10 MG extended release tablet, Take 1 tablet by mouth daily, Disp: , Rfl:     aspirin 81 MG EC tablet, Take 1 tablet by mouth daily, Disp: , Rfl:     amLODIPine (NORVASC) 5 MG tablet, Take 1 tablet by mouth daily, Disp: , Rfl:     Current Facility-Administered Medications:     0.45 % sodium chloride infusion, , IntraVENous, Continuous, Compa Mckeon MD, Last Rate: 20 mL/hr at 11/10/23

## 2023-11-10 NOTE — OP NOTE
Department of Radiology  Post Procedure Progress Note      Pre-Procedure Diagnosis:  Left renal mass    Procedure Performed:  CT guided biopsy    Anesthesia: local / versed and fentanyl    Findings: successful    Immediate Complications:  None    Estimated Blood Loss: minimal    SEE DICTATED PROCEDURE NOTE FOR COMPLETE DETAILS.     Electronically signed by Pb Wong MD on 11/10/2023 at 11:01 AM

## 2023-11-10 NOTE — PROGRESS NOTES
0830: Patient arrived ambulatory with son for renal biopsy. Patient has been NPO since last night. Patient has held aspirin for one week. Patient rights and responsibilities offered to patient. Blood work collected and sent to lab per order. IV hydration started.   Patient resting in bed, call light in reach.                            _m___ Safety:       (Environmental)  [place] to environment  Ensure ID band is correct and in place/ allergy band as needed  Assess for fall risk  Initiate fall precautions as applicable (fall band, side rails, etc.)  Call light within reach  Bed in low position/ wheels locked    _m___ Pain:       Assess pain level and characteristics  Administer analgesics as ordered  Assess effectiveness of pain management and report to MD as needed    _m___ Knowledge Deficit:  Assess baseline knowledge  Provide teaching at level of understanding  Provide teaching via preferred learning method  Evaluate teaching effectiveness    _m___ Hemodynamic/Respiratory Status:       (Pre and Post Procedure Monitoring)  Assess/Monitor vital signs and LOC  Assess Baseline SpO2 prior to any sedation  Obtain weight/height  Assess vital signs/ LOC until patient meets discharge criteria  Monitor procedure site and notify MD of any issues

## 2023-11-10 NOTE — PROGRESS NOTES
0949 Pt arrived to CT for left renal mass biopsy. Procedure explained using teach back method. Pt states understanding. 8876 Dr Dom Jones into assess patient and explain procedure. 1004 This procedure has been fully reviewed with the patient and written informed consent has been obtained. 1007 Patient assisted to table in prone position. Monitor attached to patient. Comfort ensured. 1015 Pre-procedure images obtained. Pathology called. 1020 Procedure begins. 1031 Pathology arrived. 1039 Procedure complete. Five samples obtained and given to the pathologist.   4137 Post-procedure images obtained. 1043 Monitor removed. Ointment and band aid applied to puncture site. Patient assisted to cart in semi fowlers position. Comfort ensured. 1046 Patient transported to OPN in stable condition. Report called to Paula on OPN.

## 2023-11-10 NOTE — DISCHARGE INSTRUCTIONS
POST BIOPSY DISCHARGE INSTRUCTION SHEET    DIET:  As tolerated    ACTIVITY:  Rest at home on sofa, bed or recliner today. Bathroom privileges only today. Limit any exertion (pushing or pulling) today. No lifting for 3 days. No driving today. Check biopsy site frequently today. Resume any blood thinners in 24 hours. Keep band aid clean & dry - replace as needed, may remove in 48 hours. RETURN TO NEAREST EMERGENCY ROOM IF YOU HAVE ANY OF THE FOLLOWING:  Sign of bleeding, swelling, drainage from biopsy site or severe pain (slight discomfort to be expected) around biopsy site. Repeated nausea/vomiting/abdominal pain. Elevated temperature above 101 degrees. Shortness of breath. Chest pain. Keep scheduled appointment with your physician. If sedation given, follow post sedation instruction sheet. SEDATION/ANALGESIA INFORMATION HOME GOING ADVICE    Review the following information with the patient prior to the procedure. Sedation/agalgesia is used during short medical procedures under controlled supervision. The medication will produce a strong relaxation. You will be able to hear, speak and follow instructions, but you memory and alertness will be decreased. You will be able to swallow and breathe on your own. During sedation/analgesia you blood pressure, hear and breathing will be watched closely. After the procedure, you may not remember what was said or done. You may have the following effects from the medication. Drowsiness, dizziness, sleepiness or confusion. Difficulty remembering or delayed reaction times. Loss of fine muscle control or difficulty with your balance especially while walking. Difficulty focusing or blurred vision. You may not be aware of slight changes in your behavior and/or your reaction time because of the medication used during the procedure. Therefore you should follow these instructions. Have someone responsible help you with your care.   Do not drive for 24

## 2023-11-10 NOTE — PROGRESS NOTES
1050: Patient back from procedure, tolerated well. No pain noted. Bandaid clean, dry, intact. No redness or edema noted around site. Vitals as charted. Provided with snack and beverage. 1115:Bandaid clean, dry, intact. No redness or edema noted around site. Vitals as charted. 1130:Bandaid clean, dry, intact. No redness or edema noted around site. Vitals as charted. 1145:Bandaid clean, dry, intact. No redness or edema noted around site. Vitals as charted. 1200: Bandaid clean, dry, intact. No redness or edema noted around site. Vitals as charted. Patient asleep in bed. 1230: Bandaid clean, dry, intact. No redness or edema noted around site. Vitals as charted. 1300: Bandaid clean, dry, intact. No redness or edema noted around site. Vitals as charted. 1330:Bandaid clean, dry, intact. No redness or edema noted around site. Vitals as charted. 1400: Bandaid clean, dry, intact. No redness or edema noted around site. Vitals as charted. Patient ambulated to restroom, tolerated well. 1430: Bandaid clean, dry, intact. No redness or edema noted around site. 1500: AVS reviewed with patient, voiced understanding. Patient discharged ambulatory.

## 2023-11-16 ENCOUNTER — OFFICE VISIT (OUTPATIENT)
Dept: UROLOGY | Age: 68
End: 2023-11-16
Payer: MEDICARE

## 2023-11-16 VITALS
DIASTOLIC BLOOD PRESSURE: 80 MMHG | WEIGHT: 204 LBS | SYSTOLIC BLOOD PRESSURE: 132 MMHG | BODY MASS INDEX: 32.78 KG/M2 | HEIGHT: 66 IN

## 2023-11-16 DIAGNOSIS — N32.81 OAB (OVERACTIVE BLADDER): ICD-10-CM

## 2023-11-16 DIAGNOSIS — N28.89 LEFT RENAL MASS: Primary | ICD-10-CM

## 2023-11-16 PROCEDURE — 99215 OFFICE O/P EST HI 40 MIN: CPT | Performed by: UROLOGY

## 2023-11-16 PROCEDURE — 3079F DIAST BP 80-89 MM HG: CPT | Performed by: UROLOGY

## 2023-11-16 PROCEDURE — 3075F SYST BP GE 130 - 139MM HG: CPT | Performed by: UROLOGY

## 2023-11-16 PROCEDURE — 1123F ACP DISCUSS/DSCN MKR DOCD: CPT | Performed by: UROLOGY

## 2023-11-16 NOTE — PROGRESS NOTES
[address] [facility] [address] [facility] [place] [place]  [address] [address]  Dept: [phone]  Dept Fax: [phone]: [address] [facility] Office Note -     Patient:  Winnie Bang  YOB: 1955    The patient is a 80 y.o. female who presents today for evaluation of the following problems:   Chief Complaint   Patient presents with    Other     Left renal mass, did have uti recently, on antibiotics    Results     Path biopsy results-distress survey    referred/consultation requested by MYKEL Tyson NP. History of Present Illness:    Left renal mass  Reviewed MRI and CT report  No images seen  4.5 cm enhancing renal mass  Possibly seen in 2020    OAB  On ditropan  stable      Requested/reviewed records from Itz Alfred APRN - NP office and/or outside physician/EMR    (Patient's old records have been requested, reviewed and pertinent findings summarized in today's note.)    Procedures Today: N/A      Last several PSA's:  No results found for: \"PSA\"    Last total testosterone:  No results found for: \"TESTOSTERONE\"    Urinalysis today:  No results found for this visit on 11/16/23. Last BUN and creatinine:  Lab Results   Component Value Date    BUN 27 (H) 07/12/2023     Lab Results   Component Value Date    CREATININE 1.0 11/10/2023         Imaging Reviewed during this Office Visit:   Marcelinophilippe Browne MD independently reviewed the images and verified the radiology reports from:      MRI    Arising from the lateral and     inferior left kidney, there is a lobular solid mass measuring 3.8 x 3.3 x     4.6 cm (AP by transverse by craniocaudal). The mass is not significantly     changed when compared side by side.       PAST MEDICAL, FAMILY AND SOCIAL HISTORY:  Past Medical History:   Diagnosis Date    Arthritis     Chest pain     GERD (gastroesophageal reflux disease)     Hypertension

## 2023-11-28 ENCOUNTER — TELEPHONE (OUTPATIENT)
Dept: UROLOGY | Age: 68
End: 2023-11-28

## 2023-11-28 NOTE — TELEPHONE ENCOUNTER
Patient scheduled for MRI ABDOMEN W WO   at [address] on 1/11/24. Arrival of 830 AM for a 900 AM scan time.   Order mailed with instructions or given to the patient in the office

## 2023-11-28 NOTE — TELEPHONE ENCOUNTER
Patient called in today to cancel the MRI scheduled on 1/11/24 at [facility] and the follow up on 1/18/24 with Dr Ramirez Hoff. She is seeing Dr Norbert Thompson on 12/14/23 and she will decide how to proceed after that appointment. She did not feel that the MRI was necessary to be done since she just had one in October. She will call the office at a later date if she wants to reschedule.

## 2023-11-30 ENCOUNTER — SOCIAL WORK (OUTPATIENT)
Dept: INFUSION THERAPY | Age: 68
End: 2023-11-30

## 2023-11-30 NOTE — PROGRESS NOTES
Oncology Social Work    Date: 11/30/2023  Time: 11:31 AM  Name: Abraham Donaldson  MRN: [id number]     Contact Type: Follow-up    Note:   Situation: This staff called Chen Bang via phone support to introduce myself as her Oncology Social Worker. Background:  Ken Silva had her recent appointment at the [facility]. This staff was calling to review if she had any outstanding concerns. Assessment: The contact number provided to this staff and Medical Records is her identified number however there was no ability to confirm. The number went immediately to a voicemail that was full and unable to accept any messages at this time  - Education regarding the services was not provided by the SW.  - No community referrals were placed at this time because this staff has no indication of where Ken Silva is in her treatment plan. Referral services may be reconsidered should she express needs regarding assistance. 11:49a Ken Silva returned my call and explained that although she appreciated the contact, she wasn't clean on whether or not she even has cancer. Her providers have not explained the test results to her yet to date. She explained that she has a call into her provider and will update me should she need assistance. She was very thankful for the added support and will reach out if needed. Recommendation: Follow-up will be initiated by Ken Silva based on need should she choose to return a call to the non-identified number.  was not able to provide her with my contact information but will remain available for support.         MIGUEL Nelson LSW, ACHP-SW  Oncology Social Worker      Electronically signed by MIGUEL Nelson LSW, ACHP-SW on 11/30/2023 at 11:31 AM

## 2023-12-12 DIAGNOSIS — N31.2 ATONIC BLADDER: ICD-10-CM

## 2023-12-12 DIAGNOSIS — N18.31 STAGE 3A CHRONIC KIDNEY DISEASE (HCC): ICD-10-CM

## 2023-12-12 DIAGNOSIS — I10 PRIMARY HYPERTENSION: ICD-10-CM

## 2023-12-12 DIAGNOSIS — E55.9 VITAMIN D DEFICIENCY: Primary | ICD-10-CM

## 2023-12-13 LAB
ANION GAP SERPL CALCULATED.3IONS-SCNC: 9 MEQ/L (ref 7–16)
BUN BLDV-MCNC: 18 MG/DL (ref 8–23)
CALCIUM SERPL-MCNC: 9.5 MG/DL (ref 8.5–10.5)
CHLORIDE BLD-SCNC: 108 MEQ/L (ref 95–107)
CO2: 24 MEQ/L (ref 19–31)
CREAT SERPL-MCNC: 1.11 MG/DL (ref 0.6–1.3)
CREATINE, URINE: 184.4 MG/DL
EGFR IF NONAFRICAN AMERICAN: 54 ML/MIN/1.73
GLUCOSE: 117 MG/DL (ref 70–99)
POTASSIUM SERPL-SCNC: 4.7 MEQ/L (ref 3.5–5.4)
PROTEIN, URINE: 22 MG/DL
PROTEIN/CREAT RATIO: 119 MG/G
SODIUM BLD-SCNC: 141 MEQ/L (ref 133–146)
VITAMIN D 25-HYDROXY: 8 NG/ML

## 2023-12-14 ENCOUNTER — OFFICE VISIT (OUTPATIENT)
Dept: NEPHROLOGY | Age: 68
End: 2023-12-14
Payer: MEDICARE

## 2023-12-14 VITALS
DIASTOLIC BLOOD PRESSURE: 75 MMHG | BODY MASS INDEX: 33.01 KG/M2 | OXYGEN SATURATION: 93 % | WEIGHT: 205.4 LBS | HEART RATE: 65 BPM | SYSTOLIC BLOOD PRESSURE: 130 MMHG | HEIGHT: 66 IN

## 2023-12-14 DIAGNOSIS — N18.31 STAGE 3A CHRONIC KIDNEY DISEASE (HCC): Primary | ICD-10-CM

## 2023-12-14 DIAGNOSIS — N25.81 HYPERPARATHYROIDISM, SECONDARY RENAL (HCC): ICD-10-CM

## 2023-12-14 DIAGNOSIS — E55.9 VITAMIN D DEFICIENCY: ICD-10-CM

## 2023-12-14 DIAGNOSIS — N28.89 LEFT RENAL MASS: ICD-10-CM

## 2023-12-14 LAB — PARATHYROID HORMONE INTACT: 102 PG/ML (ref 15–65)

## 2023-12-14 PROCEDURE — 3075F SYST BP GE 130 - 139MM HG: CPT | Performed by: INTERNAL MEDICINE

## 2023-12-14 PROCEDURE — 1123F ACP DISCUSS/DSCN MKR DOCD: CPT | Performed by: INTERNAL MEDICINE

## 2023-12-14 PROCEDURE — 99214 OFFICE O/P EST MOD 30 MIN: CPT | Performed by: INTERNAL MEDICINE

## 2023-12-14 PROCEDURE — 3078F DIAST BP <80 MM HG: CPT | Performed by: INTERNAL MEDICINE

## 2023-12-14 RX ORDER — ERGOCALCIFEROL 1.25 MG/1
50000 CAPSULE ORAL WEEKLY
Qty: 12 CAPSULE | Refills: 3 | Status: SHIPPED | OUTPATIENT
Start: 2023-12-14

## 2023-12-15 ENCOUNTER — TELEPHONE (OUTPATIENT)
Dept: UROLOGY | Age: 68
End: 2023-12-15

## 2023-12-15 DIAGNOSIS — N28.89 LEFT RENAL MASS: Primary | ICD-10-CM

## 2023-12-15 DIAGNOSIS — Z01.818 PRE-OP TESTING: ICD-10-CM

## 2023-12-15 NOTE — TELEPHONE ENCOUNTER
[address] [facility]  [address], [address]  [place], [address] [place] [address]  [phone]     START BOWEL PREP ON 12/28/23     Surgery Bowel Preparation     Purchase a 4.1 oz bottle of Miralax at your pharmacy which will be in powder form. Follow the instructions as directed for mixing with water and drink the entire bottle, Take 4 Dulcolax tablets the afternoon before your scheduled surgery. Start drinking approximately 2:00pm.        Start a clear liquid diet (for dinner) the evening before surgery. You may have the following:              Water              Apple, grape or cranberry juice              Clear, fat free broth              Clear sodas (7-up, Sprite)              Plain gelatin (Jell-o)              Popsicles without bits of fruit or fruit pulp              Tea or coffee without milk or cream     Nothing to eat or drink after midnight before your scheduled surgery. Do a fleets enema the night prior to your surgery between 7:00pm and 8:00pm     Please contact our office at [phone] if you have any questions.
CARDIAC CLEARANCE FORM    Clearance From  Dr Peggye Cheadle   Appointment Date   Time       Ravi SumnerHarmeet  1955  Surgeon:  Dr Leandra Sequeira    Procedure:  Robotic Assisted Laparoscopic Left Partial Nephrectomy, Possible Radical, Possible Open  Date:  12/29/23  Facility: [facility].  MEDICAL HISTORY  DM CAD PVD CVA DVT/PE MI CHFMalignant Hyperthemia HTN Tobacco/ETOH Sleep Apnea GERD Hyperlipidemia Renal Insufficiency COPD/Asthma Bleeding Disorder Pacemaker/AICD  II. CURRENT MEDICATIONS: Attach list or complete     Pt is on following meds that need special instructions for surgery:  Anticoagulants Heart Meds ASA Insulin Oral anti-diabetics NSAIDS Diuretics     K replacements  III. ALLERGIES:   IV.  FUNCTIONAL CAPACITY  >4 METS (CAN VACUUM/HOUSEWORK,CLIMB FLIGHT STAIRS WITHOUT DYSPNEA)  <4METS (FLIGHT OF STEPS CAUSES DYSPNEA/CARDIAC SYMPTOMS)   Stress Test Recommended:    Stress tests or Cardiac Cath in last 5 years:  Yes (attach report)  No   Results: WNL   ABN  Any Change in Cardiac symptoms: Yes  NO  Comments:    Revascularization in last 5 years: Yes  NO  CABG: Yes  No   Comments:     Stents:  Date: ________  Any change in cardiac symptoms  Yes  NO  Comments:   V.  REVIEW OF SYSTEMS:  (Pertinent positive or negative)    VI. PHYSICIAL EXAM  HEENT:1.  Dentations   Good Poor          HT:_______ WT:______      2. Neck Pathology: Rheumatoid DDD C-spine  BP:______ P:________  PULMONARY:  CARDIAC  ABDOMEN  EXTREMITIES  OTHER  VII.   Testing Ordered by Surgeon  Reviewed by Clearance Physician  Test      Result  Plan,if Abnormal  ___CBS     WNL  ABN____________________  ___BMP/BUN/CR    WNL  ABN____________________  ___K+      WNL  ABN____________________  ___UA      WNL  ABN____________________  ___CXR     WNL  ABN____________________  ___EKG     WNL  ABN____________________  ___MRSA     WNL  ABN____________________  VIII.  ___Acceptable risk for surgery ___Risk Unacceptable-Communication to Follow
Robotic Surgery Scheduling Form              [facility] [address], [address] [facility] [address]                          Phone * [phone]              Surgical Scheduling Direct line Phone * [phone]  Fax * [phone]        Kody FIERRO                                                           1955                     female     [address]  [facility] [address]     Marital Status:              Home Phone: [phone]   Cell Phone:       Telephone Information:   Mobile [phone]                       Surgeon: Dr. Charmayne Skelton                       Surgery Date:  12/29/23          Time: 7:30 am       Procedure: Robotic Assisted Lapar scopic Left Partial Nephrectomy, Possible Radical, Possible Open                   Outpatient/OBS              Diagnosis: Left Renal Mass     Important Medical History: IN EPIC     Special Inst/Equip: XI     CPT Codes: 25931    Latex Allergy:   Yes   Cardiac Device:  No     Case Location:                      Main OR               Preadmission Testing:  Phone Call     PAT Date and Time:  ________________________________    PAT Confirmation #: _________________________________     Post Op Visit:  ______________________________________     Need Preop Cardiac Clearance:   Yes    Does patient have Cardiologist/physician?   Dr Gali Martinez #:  ______________________________________________     Deatra Barrio: __________________________________ Date:____________________     Firefly:  No    Dual Console:  No   Ultrasound:  No    Single Site: No     RNFA (colon resection only):             Assisting Surgeon: Antonio Stock Name:  [facility]
EPIC

## 2023-12-18 ENCOUNTER — PREP FOR PROCEDURE (OUTPATIENT)
Dept: UROLOGY | Age: 68
End: 2023-12-18

## 2023-12-19 RX ORDER — BACLOFEN 10 MG/1
10 TABLET ORAL 3 TIMES DAILY
COMMUNITY
Start: 2023-08-01

## 2023-12-19 NOTE — PROGRESS NOTES
Sent abnormal EKG 10/10/23  to anesthesia for review.  No cardiac clearance ordered.  Await response

## 2023-12-19 NOTE — FLOWSHEET NOTE
Follow all instructions given by your physician  Do not eat or drink anything after midnight prior to surgery(includes water, chewing gum, mints and ice chips)  Sips of water am of surgery with allowed medications  Do not use chewing tobacco after midnight prior to surgery  May brush teeth do not swallow water  Do not smoke, drink alcoholic beverages or use any illicit drugs for 24 hours prior to surgery  Bring insurance info and photo ID  Bring pertinent paperwork with you from Doctor or surgeons's office  Wear clean comfortable, loose-fitting clothing  No make-up, nail polish, jewelry, piercings, or contact lenses to be worn day of surgery  No glue on dentures morning of surgery; you will be asked to remove them for surgery. Case for glasses.  Shower the night before and the morning of surgery with cleansing soap provided or a liquid antibacterial soap, dry with new fresh clean towel after each shower, no lotions, creams or powder.  Clean sheets and pillowcase on bed night before surgery  Bring medications in original bottles, Bring rescue inhalers with you  Bring CPAP/BIPAP machine if you have one ( you may be charged if one is needed in recovery room )    Our pharmacy has a Meds to Beds program where they will deliver any new prescriptions you may have to your room before you leave. Our Pharmacy will clear it through your insurance; for example (same co pay). This enables you to take your new RX as soon as you need when you get home and avoids stop/wait delays on the way home.  Please have a form of payment with you and have someone designated as your Pharmacy contact with their phone # as you may not feel well or still be under the influence of anesthesia.    Please refer to the SSI-Surgical Site Infection Flyer you hopefully received in the mail-together we can prevent infections; signs and symptoms reviewed.  When discharged be sure you understand how to care for your wound and that you have the Dr./office

## 2023-12-26 RX ORDER — SODIUM CHLORIDE 0.9 % (FLUSH) 0.9 %
5-40 SYRINGE (ML) INJECTION PRN
Status: CANCELLED | OUTPATIENT
Start: 2023-12-26

## 2023-12-26 RX ORDER — SODIUM CHLORIDE 9 MG/ML
INJECTION, SOLUTION INTRAVENOUS PRN
Status: CANCELLED | OUTPATIENT
Start: 2023-12-26

## 2023-12-26 RX ORDER — SODIUM CHLORIDE 0.9 % (FLUSH) 0.9 %
5-40 SYRINGE (ML) INJECTION EVERY 12 HOURS SCHEDULED
Status: CANCELLED | OUTPATIENT
Start: 2023-12-26

## 2023-12-29 ENCOUNTER — HOSPITAL ENCOUNTER (INPATIENT)
Age: 68
LOS: 3 days | Discharge: HOME | DRG: 660 | End: 2024-01-01
Attending: UROLOGY | Admitting: UROLOGY
Payer: MEDICARE

## 2023-12-29 ENCOUNTER — ANESTHESIA EVENT (OUTPATIENT)
Dept: OPERATING ROOM | Age: 68
End: 2023-12-29
Payer: MEDICARE

## 2023-12-29 ENCOUNTER — ANESTHESIA (OUTPATIENT)
Dept: OPERATING ROOM | Age: 68
End: 2023-12-29
Payer: MEDICARE

## 2023-12-29 DIAGNOSIS — R06.83 SNORING: ICD-10-CM

## 2023-12-29 DIAGNOSIS — N28.89 LEFT RENAL MASS: ICD-10-CM

## 2023-12-29 DIAGNOSIS — G47.33 OSA (OBSTRUCTIVE SLEEP APNEA): ICD-10-CM

## 2023-12-29 DIAGNOSIS — G89.18 POST-OPERATIVE PAIN: Primary | ICD-10-CM

## 2023-12-29 DIAGNOSIS — F17.200 TOBACCO DEPENDENCE SYNDROME: ICD-10-CM

## 2023-12-29 LAB
ANION GAP SERPL CALC-SCNC: 10 MEQ/L (ref 8–16)
BUN SERPL-MCNC: 18 MG/DL (ref 7–22)
CALCIUM SERPL-MCNC: 8.8 MG/DL (ref 8.5–10.5)
CHLORIDE SERPL-SCNC: 106 MEQ/L (ref 98–111)
CO2 SERPL-SCNC: 24 MEQ/L (ref 23–33)
CREAT SERPL-MCNC: 1.2 MG/DL (ref 0.4–1.2)
DEPRECATED RDW RBC AUTO: 51.9 FL (ref 35–45)
ERYTHROCYTE [DISTWIDTH] IN BLOOD BY AUTOMATED COUNT: 14.3 % (ref 11.5–14.5)
GFR SERPL CREATININE-BSD FRML MDRD: 49 ML/MIN/1.73M2
GLUCOSE SERPL-MCNC: 162 MG/DL (ref 70–108)
HCT VFR BLD AUTO: 45 % (ref 37–47)
HGB BLD-MCNC: 13.3 GM/DL (ref 12–16)
MCH RBC QN AUTO: 29.1 PG (ref 26–33)
MCHC RBC AUTO-ENTMCNC: 29.6 GM/DL (ref 32.2–35.5)
MCV RBC AUTO: 98.5 FL (ref 81–99)
PLATELET # BLD AUTO: 185 THOU/MM3 (ref 130–400)
PMV BLD AUTO: 11.2 FL (ref 9.4–12.4)
POTASSIUM SERPL-SCNC: 4.7 MEQ/L (ref 3.5–5.2)
RBC # BLD AUTO: 4.57 MILL/MM3 (ref 4.2–5.4)
SODIUM SERPL-SCNC: 140 MEQ/L (ref 135–145)
WBC # BLD AUTO: 16.1 THOU/MM3 (ref 4.8–10.8)

## 2023-12-29 PROCEDURE — 3700000001 HC ADD 15 MINUTES (ANESTHESIA): Performed by: UROLOGY

## 2023-12-29 PROCEDURE — 99222 1ST HOSP IP/OBS MODERATE 55: CPT | Performed by: PHYSICIAN ASSISTANT

## 2023-12-29 PROCEDURE — 2580000003 HC RX 258: Performed by: UROLOGY

## 2023-12-29 PROCEDURE — 7100000000 HC PACU RECOVERY - FIRST 15 MIN: Performed by: UROLOGY

## 2023-12-29 PROCEDURE — 6360000002 HC RX W HCPCS: Performed by: NURSE ANESTHETIST, CERTIFIED REGISTERED

## 2023-12-29 PROCEDURE — 6360000002 HC RX W HCPCS: Performed by: UROLOGY

## 2023-12-29 PROCEDURE — 36415 COLL VENOUS BLD VENIPUNCTURE: CPT

## 2023-12-29 PROCEDURE — 80048 BASIC METABOLIC PNL TOTAL CA: CPT

## 2023-12-29 PROCEDURE — 2720000010 HC SURG SUPPLY STERILE: Performed by: UROLOGY

## 2023-12-29 PROCEDURE — S2900 ROBOTIC SURGICAL SYSTEM: HCPCS | Performed by: UROLOGY

## 2023-12-29 PROCEDURE — 93005 ELECTROCARDIOGRAM TRACING: CPT | Performed by: UROLOGY

## 2023-12-29 PROCEDURE — 0TB14ZZ EXCISION OF LEFT KIDNEY, PERCUTANEOUS ENDOSCOPIC APPROACH: ICD-10-PCS | Performed by: UROLOGY

## 2023-12-29 PROCEDURE — 3700000000 HC ANESTHESIA ATTENDED CARE: Performed by: UROLOGY

## 2023-12-29 PROCEDURE — 3600000009 HC SURGERY ROBOT BASE: Performed by: UROLOGY

## 2023-12-29 PROCEDURE — 2709999900 HC NON-CHARGEABLE SUPPLY: Performed by: UROLOGY

## 2023-12-29 PROCEDURE — 6360000002 HC RX W HCPCS

## 2023-12-29 PROCEDURE — 1200000000 HC SEMI PRIVATE

## 2023-12-29 PROCEDURE — 94660 CPAP INITIATION&MGMT: CPT

## 2023-12-29 PROCEDURE — 88341 IMHCHEM/IMCYTCHM EA ADD ANTB: CPT

## 2023-12-29 PROCEDURE — 6370000000 HC RX 637 (ALT 250 FOR IP): Performed by: NURSE ANESTHETIST, CERTIFIED REGISTERED

## 2023-12-29 PROCEDURE — 2500000003 HC RX 250 WO HCPCS: Performed by: NURSE ANESTHETIST, CERTIFIED REGISTERED

## 2023-12-29 PROCEDURE — 3600000019 HC SURGERY ROBOT ADDTL 15MIN: Performed by: UROLOGY

## 2023-12-29 PROCEDURE — 88307 TISSUE EXAM BY PATHOLOGIST: CPT

## 2023-12-29 PROCEDURE — 88342 IMHCHEM/IMCYTCHM 1ST ANTB: CPT

## 2023-12-29 PROCEDURE — 2580000003 HC RX 258: Performed by: NURSE ANESTHETIST, CERTIFIED REGISTERED

## 2023-12-29 PROCEDURE — C1889 IMPLANT/INSERT DEVICE, NOC: HCPCS | Performed by: UROLOGY

## 2023-12-29 PROCEDURE — 85027 COMPLETE CBC AUTOMATED: CPT

## 2023-12-29 PROCEDURE — 7100000001 HC PACU RECOVERY - ADDTL 15 MIN: Performed by: UROLOGY

## 2023-12-29 DEVICE — CLIP INT L POLYMER LOK LIG HEM O LOK (6EA/PK): Type: IMPLANTABLE DEVICE | Status: FUNCTIONAL

## 2023-12-29 RX ORDER — SODIUM CHLORIDE 0.9 % (FLUSH) 0.9 %
5-40 SYRINGE (ML) INJECTION PRN
Status: DISCONTINUED | OUTPATIENT
Start: 2023-12-29 | End: 2023-12-29 | Stop reason: HOSPADM

## 2023-12-29 RX ORDER — PROMETHAZINE HYDROCHLORIDE 25 MG/ML
12.5 INJECTION, SOLUTION INTRAMUSCULAR; INTRAVENOUS EVERY 6 HOURS PRN
Status: DISCONTINUED | OUTPATIENT
Start: 2023-12-29 | End: 2024-01-01 | Stop reason: HOSPADM

## 2023-12-29 RX ORDER — OXYBUTYNIN CHLORIDE 10 MG/1
10 TABLET, EXTENDED RELEASE ORAL DAILY
Status: DISCONTINUED | OUTPATIENT
Start: 2023-12-29 | End: 2024-01-01 | Stop reason: HOSPADM

## 2023-12-29 RX ORDER — LIDOCAINE HYDROCHLORIDE 40 MG/ML
SOLUTION TOPICAL PRN
Status: DISCONTINUED | OUTPATIENT
Start: 2023-12-29 | End: 2023-12-29 | Stop reason: SDUPTHER

## 2023-12-29 RX ORDER — EPHEDRINE SULFATE/0.9% NACL/PF 50 MG/5 ML
SYRINGE (ML) INTRAVENOUS PRN
Status: DISCONTINUED | OUTPATIENT
Start: 2023-12-29 | End: 2023-12-29 | Stop reason: SDUPTHER

## 2023-12-29 RX ORDER — DEXAMETHASONE SODIUM PHOSPHATE 10 MG/ML
INJECTION, EMULSION INTRAMUSCULAR; INTRAVENOUS PRN
Status: DISCONTINUED | OUTPATIENT
Start: 2023-12-29 | End: 2023-12-29 | Stop reason: SDUPTHER

## 2023-12-29 RX ORDER — HYDROMORPHONE HYDROCHLORIDE 2 MG/ML
INJECTION, SOLUTION INTRAMUSCULAR; INTRAVENOUS; SUBCUTANEOUS PRN
Status: DISCONTINUED | OUTPATIENT
Start: 2023-12-29 | End: 2023-12-29 | Stop reason: SDUPTHER

## 2023-12-29 RX ORDER — ERGOCALCIFEROL 1.25 MG/1
50000 CAPSULE ORAL WEEKLY
Status: DISCONTINUED | OUTPATIENT
Start: 2023-12-29 | End: 2024-01-01 | Stop reason: HOSPADM

## 2023-12-29 RX ORDER — SODIUM CHLORIDE 9 MG/ML
INJECTION, SOLUTION INTRAVENOUS PRN
Status: DISCONTINUED | OUTPATIENT
Start: 2023-12-29 | End: 2023-12-29 | Stop reason: HOSPADM

## 2023-12-29 RX ORDER — SODIUM CHLORIDE 0.9 % (FLUSH) 0.9 %
5-40 SYRINGE (ML) INJECTION PRN
Status: DISCONTINUED | OUTPATIENT
Start: 2023-12-29 | End: 2024-01-01 | Stop reason: HOSPADM

## 2023-12-29 RX ORDER — SODIUM CHLORIDE 9 MG/ML
INJECTION, SOLUTION INTRAVENOUS CONTINUOUS
Status: DISCONTINUED | OUTPATIENT
Start: 2023-12-29 | End: 2023-12-30

## 2023-12-29 RX ORDER — ROCURONIUM BROMIDE 10 MG/ML
INJECTION, SOLUTION INTRAVENOUS PRN
Status: DISCONTINUED | OUTPATIENT
Start: 2023-12-29 | End: 2023-12-29 | Stop reason: SDUPTHER

## 2023-12-29 RX ORDER — BUPIVACAINE HYDROCHLORIDE 5 MG/ML
INJECTION, SOLUTION PERINEURAL PRN
Status: DISCONTINUED | OUTPATIENT
Start: 2023-12-29 | End: 2023-12-29 | Stop reason: ALTCHOICE

## 2023-12-29 RX ORDER — SODIUM CHLORIDE 0.9 % (FLUSH) 0.9 %
5-40 SYRINGE (ML) INJECTION EVERY 12 HOURS SCHEDULED
Status: DISCONTINUED | OUTPATIENT
Start: 2023-12-29 | End: 2023-12-29 | Stop reason: HOSPADM

## 2023-12-29 RX ORDER — ENOXAPARIN SODIUM 100 MG/ML
40 INJECTION SUBCUTANEOUS DAILY
Status: DISCONTINUED | OUTPATIENT
Start: 2023-12-30 | End: 2024-01-01 | Stop reason: HOSPADM

## 2023-12-29 RX ORDER — MINERAL OIL AND WHITE PETROLATUM 150; 830 MG/G; MG/G
OINTMENT OPHTHALMIC PRN
Status: DISCONTINUED | OUTPATIENT
Start: 2023-12-29 | End: 2023-12-29 | Stop reason: SDUPTHER

## 2023-12-29 RX ORDER — MORPHINE SULFATE 2 MG/ML
2 INJECTION, SOLUTION INTRAMUSCULAR; INTRAVENOUS
Status: DISCONTINUED | OUTPATIENT
Start: 2023-12-29 | End: 2023-12-31

## 2023-12-29 RX ORDER — BACLOFEN 10 MG/1
10 TABLET ORAL 3 TIMES DAILY
Status: DISCONTINUED | OUTPATIENT
Start: 2023-12-29 | End: 2023-12-31

## 2023-12-29 RX ORDER — MORPHINE SULFATE 4 MG/ML
4 INJECTION, SOLUTION INTRAMUSCULAR; INTRAVENOUS
Status: DISCONTINUED | OUTPATIENT
Start: 2023-12-29 | End: 2023-12-31

## 2023-12-29 RX ORDER — SODIUM CHLORIDE 0.9 % (FLUSH) 0.9 %
5-40 SYRINGE (ML) INJECTION EVERY 12 HOURS SCHEDULED
Status: DISCONTINUED | OUTPATIENT
Start: 2023-12-29 | End: 2024-01-01 | Stop reason: HOSPADM

## 2023-12-29 RX ORDER — FENTANYL CITRATE 50 UG/ML
INJECTION, SOLUTION INTRAMUSCULAR; INTRAVENOUS PRN
Status: DISCONTINUED | OUTPATIENT
Start: 2023-12-29 | End: 2023-12-29 | Stop reason: SDUPTHER

## 2023-12-29 RX ORDER — LIDOCAINE HYDROCHLORIDE 20 MG/ML
INJECTION, SOLUTION INTRAVENOUS PRN
Status: DISCONTINUED | OUTPATIENT
Start: 2023-12-29 | End: 2023-12-29 | Stop reason: SDUPTHER

## 2023-12-29 RX ORDER — ONDANSETRON 2 MG/ML
INJECTION INTRAMUSCULAR; INTRAVENOUS PRN
Status: DISCONTINUED | OUTPATIENT
Start: 2023-12-29 | End: 2023-12-29 | Stop reason: SDUPTHER

## 2023-12-29 RX ORDER — OXYCODONE HYDROCHLORIDE 5 MG/1
10 TABLET ORAL EVERY 4 HOURS PRN
Status: DISCONTINUED | OUTPATIENT
Start: 2023-12-29 | End: 2023-12-31

## 2023-12-29 RX ORDER — SODIUM CHLORIDE, SODIUM LACTATE, POTASSIUM CHLORIDE, CALCIUM CHLORIDE 600; 310; 30; 20 MG/100ML; MG/100ML; MG/100ML; MG/100ML
INJECTION, SOLUTION INTRAVENOUS CONTINUOUS PRN
Status: DISCONTINUED | OUTPATIENT
Start: 2023-12-29 | End: 2023-12-29 | Stop reason: SDUPTHER

## 2023-12-29 RX ORDER — SODIUM CHLORIDE 9 MG/ML
INJECTION, SOLUTION INTRAVENOUS PRN
Status: DISCONTINUED | OUTPATIENT
Start: 2023-12-29 | End: 2024-01-01 | Stop reason: HOSPADM

## 2023-12-29 RX ORDER — ONDANSETRON 4 MG/1
4 TABLET, ORALLY DISINTEGRATING ORAL EVERY 8 HOURS PRN
Status: DISCONTINUED | OUTPATIENT
Start: 2023-12-29 | End: 2024-01-01 | Stop reason: HOSPADM

## 2023-12-29 RX ORDER — ONDANSETRON 2 MG/ML
4 INJECTION INTRAMUSCULAR; INTRAVENOUS EVERY 6 HOURS PRN
Status: DISCONTINUED | OUTPATIENT
Start: 2023-12-29 | End: 2024-01-01 | Stop reason: HOSPADM

## 2023-12-29 RX ORDER — PROPOFOL 10 MG/ML
INJECTION, EMULSION INTRAVENOUS PRN
Status: DISCONTINUED | OUTPATIENT
Start: 2023-12-29 | End: 2023-12-29 | Stop reason: SDUPTHER

## 2023-12-29 RX ORDER — MIDAZOLAM HYDROCHLORIDE 1 MG/ML
INJECTION INTRAMUSCULAR; INTRAVENOUS PRN
Status: DISCONTINUED | OUTPATIENT
Start: 2023-12-29 | End: 2023-12-29 | Stop reason: SDUPTHER

## 2023-12-29 RX ORDER — MANNITOL 250 MG/ML
INJECTION, SOLUTION INTRAVENOUS PRN
Status: DISCONTINUED | OUTPATIENT
Start: 2023-12-29 | End: 2023-12-29 | Stop reason: SDUPTHER

## 2023-12-29 RX ORDER — OXYCODONE HYDROCHLORIDE 5 MG/1
5 TABLET ORAL EVERY 4 HOURS PRN
Status: DISCONTINUED | OUTPATIENT
Start: 2023-12-29 | End: 2023-12-31

## 2023-12-29 RX ADMIN — SUGAMMADEX 200 MG: 100 INJECTION, SOLUTION INTRAVENOUS at 10:24

## 2023-12-29 RX ADMIN — ONDANSETRON 4 MG: 2 INJECTION INTRAMUSCULAR; INTRAVENOUS at 17:00

## 2023-12-29 RX ADMIN — MORPHINE SULFATE 4 MG: 4 INJECTION, SOLUTION INTRAMUSCULAR; INTRAVENOUS at 17:04

## 2023-12-29 RX ADMIN — ROCURONIUM BROMIDE 50 MG: 10 INJECTION INTRAVENOUS at 07:51

## 2023-12-29 RX ADMIN — Medication 10 MG: at 08:06

## 2023-12-29 RX ADMIN — MIDAZOLAM 2 MG: 1 INJECTION INTRAMUSCULAR; INTRAVENOUS at 07:45

## 2023-12-29 RX ADMIN — SODIUM CHLORIDE: 9 INJECTION, SOLUTION INTRAVENOUS at 07:45

## 2023-12-29 RX ADMIN — ROCURONIUM BROMIDE 30 MG: 10 INJECTION INTRAVENOUS at 08:52

## 2023-12-29 RX ADMIN — LIDOCAINE HYDROCHLORIDE 100 MG: 20 INJECTION, SOLUTION INTRAVENOUS at 07:51

## 2023-12-29 RX ADMIN — ONDANSETRON 4 MG: 2 INJECTION INTRAMUSCULAR; INTRAVENOUS at 08:01

## 2023-12-29 RX ADMIN — PROPOFOL 200 MG: 10 INJECTION, EMULSION INTRAVENOUS at 07:50

## 2023-12-29 RX ADMIN — Medication 10 MG: at 07:59

## 2023-12-29 RX ADMIN — PROMETHAZINE HYDROCHLORIDE 12.5 MG: 25 INJECTION INTRAMUSCULAR; INTRAVENOUS at 18:49

## 2023-12-29 RX ADMIN — PHENYLEPHRINE HYDROCHLORIDE 100 MCG: 10 INJECTION INTRAVENOUS at 08:16

## 2023-12-29 RX ADMIN — FENTANYL CITRATE 50 MCG: 50 INJECTION INTRAMUSCULAR; INTRAVENOUS at 08:34

## 2023-12-29 RX ADMIN — MANNITOL 12.5 G: 12.5 INJECTION, SOLUTION INTRAVENOUS at 09:30

## 2023-12-29 RX ADMIN — SODIUM CHLORIDE, POTASSIUM CHLORIDE, SODIUM LACTATE AND CALCIUM CHLORIDE: 600; 310; 30; 20 INJECTION, SOLUTION INTRAVENOUS at 08:40

## 2023-12-29 RX ADMIN — HYDROMORPHONE HYDROCHLORIDE 0.6 MG: 2 INJECTION INTRAMUSCULAR; INTRAVENOUS; SUBCUTANEOUS at 09:17

## 2023-12-29 RX ADMIN — MINERAL OIL AND WHITE PETROLATUM 2 APPLICATOR: 150; 830 OINTMENT OPHTHALMIC at 07:53

## 2023-12-29 RX ADMIN — FENTANYL CITRATE 100 MCG: 50 INJECTION INTRAMUSCULAR; INTRAVENOUS at 07:51

## 2023-12-29 RX ADMIN — Medication 10 MG: at 08:22

## 2023-12-29 RX ADMIN — PHENYLEPHRINE HYDROCHLORIDE 200 MCG: 10 INJECTION INTRAVENOUS at 10:08

## 2023-12-29 RX ADMIN — LIDOCAINE HYDROCHLORIDE 4 ML: 40 SOLUTION TOPICAL at 07:52

## 2023-12-29 RX ADMIN — Medication 2000 MG: at 07:58

## 2023-12-29 RX ADMIN — HYDROMORPHONE HYDROCHLORIDE 0.4 MG: 2 INJECTION INTRAMUSCULAR; INTRAVENOUS; SUBCUTANEOUS at 09:41

## 2023-12-29 RX ADMIN — HYDROMORPHONE HYDROCHLORIDE 0.4 MG: 2 INJECTION INTRAMUSCULAR; INTRAVENOUS; SUBCUTANEOUS at 09:56

## 2023-12-29 RX ADMIN — FENTANYL CITRATE 50 MCG: 50 INJECTION INTRAMUSCULAR; INTRAVENOUS at 08:58

## 2023-12-29 RX ADMIN — HYDROMORPHONE HYDROCHLORIDE 0.6 MG: 2 INJECTION INTRAMUSCULAR; INTRAVENOUS; SUBCUTANEOUS at 10:05

## 2023-12-29 RX ADMIN — MORPHINE SULFATE 2 MG: 2 INJECTION, SOLUTION INTRAMUSCULAR; INTRAVENOUS at 22:45

## 2023-12-29 RX ADMIN — DEXAMETHASONE SODIUM PHOSPHATE 10 MG: 10 INJECTION, EMULSION INTRAMUSCULAR; INTRAVENOUS at 08:01

## 2023-12-29 RX ADMIN — FENTANYL CITRATE 50 MCG: 50 INJECTION INTRAMUSCULAR; INTRAVENOUS at 08:48

## 2023-12-29 RX ADMIN — ROCURONIUM BROMIDE 20 MG: 10 INJECTION INTRAVENOUS at 09:40

## 2023-12-29 RX ADMIN — PHENYLEPHRINE HYDROCHLORIDE 100 MCG: 10 INJECTION INTRAVENOUS at 08:23

## 2023-12-29 ASSESSMENT — PAIN - FUNCTIONAL ASSESSMENT
PAIN_FUNCTIONAL_ASSESSMENT: 0-10
PAIN_FUNCTIONAL_ASSESSMENT: ACTIVITIES ARE NOT PREVENTED
PAIN_FUNCTIONAL_ASSESSMENT: ACTIVITIES ARE NOT PREVENTED

## 2023-12-29 ASSESSMENT — PAIN DESCRIPTION - ORIENTATION
ORIENTATION: UPPER;LOWER;RIGHT
ORIENTATION: LEFT;LOWER

## 2023-12-29 ASSESSMENT — PAIN DESCRIPTION - LOCATION
LOCATION: ABDOMEN
LOCATION: ABDOMEN;FLANK

## 2023-12-29 ASSESSMENT — PAIN DESCRIPTION - DESCRIPTORS
DESCRIPTORS: SORE
DESCRIPTORS: ACHING

## 2023-12-29 ASSESSMENT — PAIN SCALES - GENERAL
PAINLEVEL_OUTOF10: 6
PAINLEVEL_OUTOF10: 8

## 2023-12-29 NOTE — H&P
History and Physical    Patient:  Meka Bang  MRN: [id number]  YOB: 1955    CHIEF COMPLAINT:  left renalmass    HISTORY OF PRESENT ILLNESS:   The patient is a 68 y.o. female who presents with as above, here for surgery    Patient's old records, notes and chart reviewed and summarized above.     Past Medical History:    Past Medical History:   Diagnosis Date    Arthritis     Cancer (HCC)     kidney    Chest pain     GERD (gastroesophageal reflux disease)     Kidney stones     MI (myocardial infarction) (HCC)         Migraine     migraines       Past Surgical History:    Past Surgical History:   Procedure Laterality Date    APPENDECTOMY       SECTION      COLONOSCOPY      CT BIOPSY RENAL  11/10/2023    CT BIOPSY RENAL 11/10/2023 [facility] CT SCAN    FINGER TRIGGER RELEASE      JOINT REPLACEMENT Right 2022    KNEE ARTHROSCOPY      UPPER GASTROINTESTINAL ENDOSCOPY       Medications Prior to Admission:    Prior to Admission medications    Medication Sig Start Date End Date Taking? Authorizing Provider   baclofen (LIORESAL) 10 MG tablet Take 1 tablet by mouth 3 times daily 23  Yes Rajendra Hassan MD   vitamin D (ERGOCALCIFEROL) 1.25 MG (62868 UT) CAPS capsule Take 1 capsule by mouth once a week 23   Gucci Bynum MD   oxybutynin (DITROPAN-XL) 10 MG extended release tablet Take 1 tablet by mouth daily    Rajendra Hassan MD   aspirin 81 MG EC tablet Take 1 tablet by mouth daily    Rajendra Hassan MD       Allergies:  Latex, Codeine, and Hydrocodone-acetaminophen    Social History:    Social History     Socioeconomic History    Marital status:      Spouse name: Not on file    Number of children: Not on file    Years of education: Not on file    Highest education level: Not on file   Occupational History    Not on file   Tobacco Use    Smoking status: Every Day     Current packs/day: 1.00     Average packs/day: 1 pack/day for 50.0 years

## 2023-12-29 NOTE — PROGRESS NOTES
Patient drowsy from morphine and phenergan at this time. IS education deferred to night shift due to AMS/ lethargy. IS placed on table.    Patient and family educated patient needs to ambulate at least around room tonight/ early morning.     Patient could not tolerate popsicles, ice chips or water without nausea and emesis.

## 2023-12-29 NOTE — FLOWSHEET NOTE
12/29/23 1805   Treatment Team Notification   Reason for Communication Medication concern   Name of Team Member Notified Aj Jeter   Treatment Team Role Consulting Provider   Method of Communication Secure Message   Response Waiting for response   Notification Time 1805     Notified patient has vomited 3x. Asked if something else could be ordered besides zofran for nausea.     Order placed for Phenergan 12.5 mg IM PRN Q6H.

## 2023-12-29 NOTE — FLOWSHEET NOTE
12/29/23 1758   Treatment Team Notification   Reason for Communication Review case   Name of Team Member Notified Dr. Greco   Treatment Team Role Consulting Provider   Method of Communication Secure Message   Response Waiting for response   Notification Time 1758     STAT Hospitalist consult completed. Notified Dr. Greco that patient is now stable on 3L nasal cannula since vomiting twice. Dr. Greco asked that this RN message to see if they still want him consulted. RN relayed message to Aj Jeter.     Aj Jeter PAC and Dr. Greco spoke. Hospitalist will stay on board.

## 2023-12-29 NOTE — PROGRESS NOTES
Patient came up to unit on CPAP. Order is in for continous CPAP. Patient became very anxious and said she was going to vomit. CPAP removed due to risk of aspiration. Antiemetic given. Patient dry heaving.     6L nasal cannula applied. Patient awake a oxyegn saturation is 89-91%.     Patient reporting crushing chest pain 8/10. STAT EKG ordered. Morphine given per order for pain. STAT EKG showed no acute findings.     Aj Jeter, covering for Dr. Schwarz, notified of patient condition. Notified with continuous CPAP she meets criteria for step down. Additionally, the patient or family was not updated after surgery. Family and patient are upset - Aj Jeter notified.     1855: Dr. Schwarz is going to call sonCoy. STAT consult placed to hospitalist.

## 2023-12-29 NOTE — ANESTHESIA POSTPROCEDURE EVALUATION
Department of Anesthesiology  Postprocedure Note    Patient: Mallory Cole  MRN: [id number]  YOB: 1955  Date of evaluation: 12/29/2023    Procedure Summary       Date: 12/29/23 Room / Location: [address] [place] [address]    Anesthesia Start: 9405 Anesthesia Stop: 5986    Procedure: ROBOTIC LAPAROSCOPIC  LEFT PARTIAL NEPHRECTOMY (Left) Diagnosis:       Left renal mass      (Left renal mass [N28.89])    Surgeons: Betsy Bell MD Responsible Provider: Liss Sotelo DO    Anesthesia Type: general ASA Status: 3            Anesthesia Type: No value filed. Darren Phase I: Darren Score: 7    Darren Phase II:      Anesthesia Post Evaluation    Patient location during evaluation: PACU  Patient participation: complete - patient participated  Level of consciousness: awake  Airway patency: patent  Nausea & Vomiting: no nausea  Cardiovascular status: hemodynamically stable  Respiratory status: acceptable and BIPAP  Hydration status: stable  Pain management: adequate    No notable events documented.

## 2023-12-29 NOTE — PROGRESS NOTES
1051- pt to pacu. VSS. P0t non responsive. Pt with oral airway in place. Pt appears in no acute distress.    1104- pt continues to be non responsive. VSS. Oral airway in place.     1111- pt coughing, lifting head, oral airway removed. Pt follows command, C&DB performed. VSS. Pt denies pain, nausea . Pt drifts back to sleep easily.    1126- pt very drowsy, continue to encourage C&DB.    1132- pt reports did not bring cpap to hospital, pt not maintaining oxygenation on NC/ placed back on simple mask.    1141- pt appears less drowsy when aroused, NC replaced.    1145- pt unable to maintain oxygenation without simple mask. Pt states she did not bring her home cpap, does not typically wear at home, pt agreeable to wear hospital cpap. Discussed with anesthesia, orders received.    1155- respiratory at bedside to place cpap.    1205- pt resting in bed eyes closed. Tolerates cpap well. VSS. Pt continues to deny pain.    1215- pt remains resting in bed eyes closed. Vss. Pt continues to tolerate cpap.    1235- pt sleeping in bed, eyes closed.    1254- pt resting in bed eyes closed.    1311- pt wakes easily, continues to deny pain, nausea. VSS. Pt drifts back to sleep quickly.    1344- Update to Pt Sister Monique. Will update with IP room once available.    1420- pt remains asleep on cpap. Vss.     1450- pt woke, continues to report pain tolerable. Reports still feeling very sleepy, unable to stay awake without verbal stimulation. Transition pt to NC at this time for trial.    1505- pt desat to 90% on 6L NC pt reports still feeling very tired, reports not been getting good sleep at home recently. pt placed back on cpap. Oxygen sat back to WDL. Pt continues to tolerate cpap well.    1533- pt resting in bed on cpap, eyes closed. VSS. IP room cleaning.    1616- IP room ready, report called to 5K nurse  no answer, will call back. transport requested, respiratory notified for cpap transfer. Sister Monique updated with room number, pt

## 2023-12-29 NOTE — PROGRESS NOTES
Patient to operating room, no glasses, contacts, dentures, hearing aids, or jewelery. All personal items left in same day surgery room.

## 2023-12-30 ENCOUNTER — APPOINTMENT (OUTPATIENT)
Dept: GENERAL RADIOLOGY | Age: 68
DRG: 660 | End: 2023-12-30
Attending: UROLOGY
Payer: MEDICARE

## 2023-12-30 LAB
ANION GAP SERPL CALC-SCNC: 10 MEQ/L (ref 8–16)
BUN SERPL-MCNC: 24 MG/DL (ref 7–22)
CALCIUM SERPL-MCNC: 8.3 MG/DL (ref 8.5–10.5)
CHLORIDE SERPL-SCNC: 109 MEQ/L (ref 98–111)
CO2 SERPL-SCNC: 22 MEQ/L (ref 23–33)
CREAT SERPL-MCNC: 1.5 MG/DL (ref 0.4–1.2)
DEPRECATED RDW RBC AUTO: 53.3 FL (ref 35–45)
EKG ATRIAL RATE: 80 BPM
EKG P AXIS: 31 DEGREES
EKG P-R INTERVAL: 158 MS
EKG Q-T INTERVAL: 392 MS
EKG QRS DURATION: 78 MS
EKG QTC CALCULATION (BAZETT): 452 MS
EKG R AXIS: -39 DEGREES
EKG T AXIS: -10 DEGREES
EKG VENTRICULAR RATE: 80 BPM
ERYTHROCYTE [DISTWIDTH] IN BLOOD BY AUTOMATED COUNT: 14.4 % (ref 11.5–14.5)
GFR SERPL CREATININE-BSD FRML MDRD: 38 ML/MIN/1.73M2
GLUCOSE SERPL-MCNC: 122 MG/DL (ref 70–108)
HCT VFR BLD AUTO: 41.1 % (ref 37–47)
HGB BLD-MCNC: 12 GM/DL (ref 12–16)
MCH RBC QN AUTO: 29.3 PG (ref 26–33)
MCHC RBC AUTO-ENTMCNC: 29.2 GM/DL (ref 32.2–35.5)
MCV RBC AUTO: 100.2 FL (ref 81–99)
PLATELET # BLD AUTO: 147 THOU/MM3 (ref 130–400)
PMV BLD AUTO: 11.5 FL (ref 9.4–12.4)
POTASSIUM SERPL-SCNC: 4.9 MEQ/L (ref 3.5–5.2)
RBC # BLD AUTO: 4.1 MILL/MM3 (ref 4.2–5.4)
SODIUM SERPL-SCNC: 141 MEQ/L (ref 135–145)
WBC # BLD AUTO: 13.2 THOU/MM3 (ref 4.8–10.8)

## 2023-12-30 PROCEDURE — 2700000000 HC OXYGEN THERAPY PER DAY

## 2023-12-30 PROCEDURE — 99232 SBSQ HOSP IP/OBS MODERATE 35: CPT | Performed by: INTERNAL MEDICINE

## 2023-12-30 PROCEDURE — 94660 CPAP INITIATION&MGMT: CPT

## 2023-12-30 PROCEDURE — 71046 X-RAY EXAM CHEST 2 VIEWS: CPT

## 2023-12-30 PROCEDURE — 2580000003 HC RX 258: Performed by: UROLOGY

## 2023-12-30 PROCEDURE — 80048 BASIC METABOLIC PNL TOTAL CA: CPT

## 2023-12-30 PROCEDURE — 6370000000 HC RX 637 (ALT 250 FOR IP): Performed by: INTERNAL MEDICINE

## 2023-12-30 PROCEDURE — 85027 COMPLETE CBC AUTOMATED: CPT

## 2023-12-30 PROCEDURE — 99024 POSTOP FOLLOW-UP VISIT: CPT | Performed by: NURSE PRACTITIONER

## 2023-12-30 PROCEDURE — 36415 COLL VENOUS BLD VENIPUNCTURE: CPT

## 2023-12-30 PROCEDURE — 94761 N-INVAS EAR/PLS OXIMETRY MLT: CPT

## 2023-12-30 PROCEDURE — 94640 AIRWAY INHALATION TREATMENT: CPT

## 2023-12-30 PROCEDURE — 6360000002 HC RX W HCPCS: Performed by: UROLOGY

## 2023-12-30 PROCEDURE — 1200000000 HC SEMI PRIVATE

## 2023-12-30 PROCEDURE — 6370000000 HC RX 637 (ALT 250 FOR IP): Performed by: UROLOGY

## 2023-12-30 PROCEDURE — 93010 ELECTROCARDIOGRAM REPORT: CPT | Performed by: INTERNAL MEDICINE

## 2023-12-30 RX ORDER — IPRATROPIUM BROMIDE AND ALBUTEROL SULFATE 2.5; .5 MG/3ML; MG/3ML
1 SOLUTION RESPIRATORY (INHALATION)
Status: DISCONTINUED | OUTPATIENT
Start: 2023-12-31 | End: 2024-01-01 | Stop reason: HOSPADM

## 2023-12-30 RX ORDER — ALBUTEROL SULFATE 2.5 MG/3ML
2.5 SOLUTION RESPIRATORY (INHALATION) EVERY 4 HOURS PRN
Status: DISCONTINUED | OUTPATIENT
Start: 2023-12-30 | End: 2024-01-01 | Stop reason: HOSPADM

## 2023-12-30 RX ORDER — IPRATROPIUM BROMIDE AND ALBUTEROL SULFATE 2.5; .5 MG/3ML; MG/3ML
1 SOLUTION RESPIRATORY (INHALATION)
Status: DISCONTINUED | OUTPATIENT
Start: 2023-12-30 | End: 2023-12-30

## 2023-12-30 RX ADMIN — OXYCODONE 5 MG: 5 TABLET ORAL at 15:41

## 2023-12-30 RX ADMIN — ENOXAPARIN SODIUM 40 MG: 100 INJECTION SUBCUTANEOUS at 09:12

## 2023-12-30 RX ADMIN — OXYCODONE 5 MG: 5 TABLET ORAL at 09:18

## 2023-12-30 RX ADMIN — OXYBUTYNIN CHLORIDE 10 MG: 10 TABLET, EXTENDED RELEASE ORAL at 09:12

## 2023-12-30 RX ADMIN — IPRATROPIUM BROMIDE AND ALBUTEROL SULFATE 1 DOSE: .5; 3 SOLUTION RESPIRATORY (INHALATION) at 17:54

## 2023-12-30 RX ADMIN — BACLOFEN 10 MG: 10 TABLET ORAL at 09:12

## 2023-12-30 RX ADMIN — SODIUM CHLORIDE: 9 INJECTION, SOLUTION INTRAVENOUS at 03:17

## 2023-12-30 RX ADMIN — BACLOFEN 10 MG: 10 TABLET ORAL at 15:41

## 2023-12-30 RX ADMIN — BACLOFEN 10 MG: 10 TABLET ORAL at 19:54

## 2023-12-30 ASSESSMENT — PAIN DESCRIPTION - ORIENTATION
ORIENTATION: LEFT
ORIENTATION: LEFT
ORIENTATION: RIGHT;UPPER;LOWER

## 2023-12-30 ASSESSMENT — PAIN SCALES - GENERAL
PAINLEVEL_OUTOF10: 6
PAINLEVEL_OUTOF10: 5
PAINLEVEL_OUTOF10: 2
PAINLEVEL_OUTOF10: 0
PAINLEVEL_OUTOF10: 0

## 2023-12-30 ASSESSMENT — PAIN - FUNCTIONAL ASSESSMENT
PAIN_FUNCTIONAL_ASSESSMENT: PREVENTS OR INTERFERES WITH MANY ACTIVE NOT PASSIVE ACTIVITIES
PAIN_FUNCTIONAL_ASSESSMENT: ACTIVITIES ARE NOT PREVENTED

## 2023-12-30 ASSESSMENT — PAIN DESCRIPTION - LOCATION
LOCATION: ABDOMEN

## 2023-12-30 ASSESSMENT — PAIN DESCRIPTION - DESCRIPTORS
DESCRIPTORS: ACHING
DESCRIPTORS: TENDER
DESCRIPTORS: ACHING

## 2023-12-30 NOTE — RT PROTOCOL NOTE
RT Inhaler-Nebulizer Bronchodilator Protocol Note    There is a bronchodilator order in the chart from a provider indicating to follow the RT Bronchodilator Protocol and there is an “Initiate RT Inhaler-Nebulizer Bronchodilator Protocol” order as well (see protocol at bottom of note).    CXR Findings:  No results found.    The findings from the last RT Protocol Assessment were as follows:   History Pulmonary Disease: Smoker 15 pack years or more  Respiratory Pattern: Dyspnea on exertion or RR 21-25 bpm  Breath Sounds: Slightly diminished and/or crackles  Cough: Strong, productive  Indication for Bronchodilator Therapy: Decreased or absent breath sounds  Bronchodilator Assessment Score: 6    Aerosolized bronchodilator medication orders have been revised according to the RT Inhaler-Nebulizer Bronchodilator Protocol below.    Respiratory Therapist to perform RT Therapy Protocol Assessment initially then follow the protocol.  Repeat RT Therapy Protocol Assessment PRN for score 0-3 or on second treatment, BID, and PRN for scores above 3.    No Indications - adjust the frequency to every 6 hours PRN wheezing or bronchospasm, if no treatments needed after 48 hours then discontinue using Per Protocol order mode.     If indication present, adjust the RT bronchodilator orders based on the Bronchodilator Assessment Score as indicated below.  Use Inhaler orders unless patient has one or more of the following: on home nebulizer, not able to hold breath for 10 seconds, is not alert and oriented, cannot activate and use MDI correctly, or respiratory rate 25 breaths per minute or more, then use the equivalent nebulizer order(s) with same Frequency and PRN reasons based on the score.  If a patient is on this medication at home then do not decrease Frequency below that used at home.    0-3 - enter or revise RT bronchodilator order(s) to equivalent RT Bronchodilator order with Frequency of every 4 hours PRN for wheezing or increased

## 2023-12-30 NOTE — PROGRESS NOTES
Florence Fiore, APRN - CNP  Urology Progress Note    Subjective: Meka Bang is a 68 y.o. female.    s/p ROBOTIC LAPAROSCOPIC  LEFT PARTIAL NEPHRECTOMY on 12/29/2023    His/Her current Diet is: ADULT DIET; Regular.    Since the previous note, the patient reports the following:  No acute issues overnight.  No fevers or chills.  No nausea or vomiting.  No chest pain or shortness of breath.  No calf pain.  Pain Controlled.  Needs to ambulate.   Tolerating PO Diet.    Patient required post-operative oxygen. Currently on 6L NC. Hospitalists consulted, will try to wean off oxygen today. Home O2 eval ordered.     Vitals and Labs:  Patient Vitals for the past 24 hrs:   BP Temp Temp src Pulse Resp SpO2   12/30/23 0322 106/60 98.4 °F (36.9 °C) Oral 74 16 97 %   12/29/23 2315 118/64 98 °F (36.7 °C) Oral 72 15 94 %   12/29/23 2250 -- -- -- -- -- 92 %   12/29/23 2245 -- -- -- -- 16 (!) 85 %   12/29/23 2050 109/63 97.8 °F (36.6 °C) Axillary 88 19 94 %   12/29/23 1830 -- -- -- -- -- 94 %   12/29/23 1734 -- -- -- -- 14 99 %   12/29/23 1727 -- -- -- -- -- 94 %   12/29/23 1715 -- -- -- -- -- (!) 89 %   12/29/23 1643 116/67 97.7 °F (36.5 °C) Axillary 62 12 96 %   12/29/23 1635 -- -- -- 80 -- 98 %   12/29/23 1630 (!) 102/57 -- -- 73 (!) 8 98 %   12/29/23 1625 (!) 102/57 -- -- 73 10 98 %   12/29/23 1620 117/63 -- -- 76 12 98 %   12/29/23 1615 105/62 -- -- 76 10 98 %   12/29/23 1610 (!) 100/57 -- -- 78 10 98 %   12/29/23 1605 (!) 106/56 -- -- 75 (!) 9 98 %   12/29/23 1600 (!) 113/57 -- -- 74 (!) 8 98 %   12/29/23 1555 (!) 106/55 -- -- 74 11 98 %   12/29/23 1550 (!) 104/58 -- -- 72 11 98 %   12/29/23 1545 (!) 108/55 -- -- 75 (!) 9 98 %   12/29/23 1540 114/61 -- -- 72 (!) 9 98 %   12/29/23 1535 116/62 -- -- 69 11 98 %   12/29/23 1530 112/62 -- -- 69 19 98 %   12/29/23 1525 114/60 -- -- 68 16 98 %   12/29/23 1520 117/60 -- -- 69 10 98 %   12/29/23 1515 114/63 -- -- 69 16 98 %   12/29/23 1510 116/62 -- -- 68 16 97 %

## 2023-12-30 NOTE — CONSULTS
Hospitalist Consult History & Physical      Patient:  Meka Bang  YOB: 1955  MRN: [id number]     Acct: [id number]        ASSESSMENT:PLAN      Left partial nephrectomy 2/2 Renal mass   POD 0   Managed per primary     CAD  ?Hx of MI - patient disputed this on assessment  Resume aspirin post operatively       CKD stage III  Creatinine 1.2 - baseline around 1  Continue to monitor kidney status    ANGELO with postoperative respiratory insuffiencey   Currently on 6L NC - could tolerate CPAP in PACU and was aspiration risk   Incentive spirometry q4 while awake  Repeat CXR if unable to wean oxygen down during waking hours    nicotine dependence  Can offer nicotine patch  ~50 pack year history     Thank you, Omar Schwarz MD, for the consultation.  Hospitalist service will  continue to follow along.      Electronically signed by RAFAEL Lozano on 12/29/2023 at 9:32 PM      ===================================================================      Chief Complaint:  renal mass    Date of Service: Pt seen/examined in consultation on 12/29/23.       History Of Present Illness:  Meka Bang is a 68 y.o. female who we are asked to see/evaluate by Omar Schwarz MD for medical management of acute and chronic medical conditions. Patient was resting comfortably in bed during assessment. She was trialed on CPAP for post operative respiratory support but could tolerate it and endorses history of intolerance to CPAP device so she was placed on 6L NC while recovering from anesthesia. She denies any chest pain, dyspnea, numbness and tingling, nausea, dizziness  She had no other complaints at this time.     Past Medical History:        Diagnosis Date    Arthritis     Cancer (HCC)     kidney    Chest pain     GERD (gastroesophageal reflux disease)     Kidney stones     MI (myocardial infarction) (HCC)     2021    Migraine 1976    migraines       Past Surgical History:        Procedure Laterality  Active and ongoing    Electronically signed by RAFAEL Lozano on 12/29/2023 at 9:32 PM

## 2023-12-30 NOTE — PLAN OF CARE
Problem: Discharge Planning  Goal: Discharge to home or other facility with appropriate resources  Outcome: Progressing  Flowsheets (Taken 12/29/2023 2050)  Discharge to home or other facility with appropriate resources:   Identify barriers to discharge with patient and caregiver   Arrange for needed discharge resources and transportation as appropriate   Identify discharge learning needs (meds, wound care, etc)  From home. Awaiting further discharge plans and orders.     Problem: Chronic Conditions and Co-morbidities  Goal: Patient's chronic conditions and co-morbidity symptoms are monitored and maintained or improved  Outcome: Progressing  Flowsheets (Taken 12/29/2023 2050)  Care Plan - Patient's Chronic Conditions and Co-Morbidity Symptoms are Monitored and Maintained or Improved:   Monitor and assess patient's chronic conditions and comorbid symptoms for stability, deterioration, or improvement   Collaborate with multidisciplinary team to address chronic and comorbid conditions and prevent exacerbation or deterioration   Update acute care plan with appropriate goals if chronic or comorbid symptoms are exacerbated and prevent overall improvement and discharge  Kept monitored for untoward signs and symptoms.     Problem: Pain  Goal: Verbalizes/displays adequate comfort level or baseline comfort level  Outcome: Progressing  Flowsheets (Taken 12/29/2023 2050)  Verbalizes/displays adequate comfort level or baseline comfort level:   Encourage patient to monitor pain and request assistance   Assess pain using appropriate pain scale   Implement non-pharmacological measures as appropriate and evaluate response  Pain Assessment: None - Denies Pain  Pain Level: 6   Patient's Stated Pain Goal: 2   Is pain goal met at this time?  Yes     Non-Pharmaceutical Pain Intervention(s): Rest, Distraction      Problem: Safety - Adult  Goal: Free from fall injury  Outcome: Progressing  Fall assessment completed. Patient using call  light appropriately to call for assistance with ambulation to bathroom.  Personal items within reach. Patient is also compliant with use of non-skid slippers.       Problem: Skin/Tissue Integrity  Goal: Absence of new skin breakdown  Description: 1.  Monitor for areas of redness and/or skin breakdown  2.  Assess vascular access sites hourly  3.  Every 4-6 hours minimum:  Change oxygen saturation probe site  4.  Every 4-6 hours:  If on nasal continuous positive airway pressure, respiratory therapy assess nares and determine need for appliance change or resting period.  Outcome: Progressing   No skin breakdown this shift. Patient able to turn self on bed. Patient states understanding of repositioning every two hours.     Patient with intact and patent shine catheter connected to urine bag, urine draining well. Catheter care with perineal wipes done.     Patient educated on use of daily CHG bath to prevent surgical site infection, s/s of infection, use of incentive spirometer and early ambulation. Patient verbalized understanding. CHG bath refused on this shift.      Care plan reviewed with patient. Patient verbalizes understanding of the care plan and contributes to goal setting.

## 2023-12-30 NOTE — PROGRESS NOTES
Hospitalist Progress Note    Patient:  Meka Bang      Unit/Bed:5-10/010-A    YOB: 1955    MRN: [id number]       Acct: [id number]     PCP: Amanda Monterroso APRN - NP    Date of Admission: 12/29/2023    Active Hospital Problems    Diagnosis Date Noted    Renal mass [N28.89] 12/29/2023     Assessment/Plan:    # Left Renal Mass s/p Left Partial Nephrectomy on 12/29  - Mgmt per primary team urology     # Post Operative Respiratory Insuffiencey with Underlying ANGELO  - post operative requiring 6L NC  - when asleep pulse Ox drops to low 80's, does well on 2L NC during day  - will order 2V CXR today  - duo-neb treatments  - CPAP nightly ordered   - home O2 eval     # CKD Stage III  - Creatinine 1.2 - baseline around 1  - Continue to monitor kidney status    # nicotine dependence  - Can offer nicotine patch  - 50 pack year history     # CAD  ?Hx of MI - patient disputed this on assessment  - Resume aspirin post operatively          Chief Complaint: renal mass     Hospital Course: Meka Bang is a 68 y.o. female who we are asked to see/evaluate by Omar Schwarz MD for medical management of acute and chronic medical conditions. Patient was resting comfortably in bed during assessment. She was trialed on CPAP for post operative respiratory support but could tolerate it and endorses history of intolerance to CPAP device so she was placed on 6L NC while recovering from anesthesia. She denies any chest pain, dyspnea, numbness and tingling, nausea, dizziness  She had no other complaints at this time.     Subjective: no acute events overnight. Sitting up in chair, family at bedside. No complaints. No fevers, chills, chest pain. Tolerating PO intake.      Medications:  Reviewed    Infusion Medications    sodium chloride       Scheduled Medications    ipratropium 0.5 mg-albuterol 2.5 mg  1 Dose Inhalation Q6H WA RT    oxyBUTYnin  10 mg Oral Daily    vitamin D  50,000 Units Oral

## 2023-12-30 NOTE — PLAN OF CARE
Problem: Discharge Planning  Goal: Discharge to home or other facility with appropriate resources  12/30/2023 1222 by Twan Fowler, RN  Outcome: Progressing  Flowsheets (Taken 12/30/2023 1222)  Discharge to home or other facility with appropriate resources:   Identify barriers to discharge with patient and caregiver   Identify discharge learning needs (meds, wound care, etc)   Refer to discharge planning if patient needs post-hospital services based on physician order or complex needs related to functional status, cognitive ability or social support system   Arrange for needed discharge resources and transportation as appropriate     Problem: Chronic Conditions and Co-morbidities  Goal: Patient's chronic conditions and co-morbidity symptoms are monitored and maintained or improved  12/30/2023 1222 by Twan Fowler, RN  Outcome: Progressing  Flowsheets (Taken 12/30/2023 1222)  Care Plan - Patient's Chronic Conditions and Co-Morbidity Symptoms are Monitored and Maintained or Improved:   Monitor and assess patient's chronic conditions and comorbid symptoms for stability, deterioration, or improvement   Collaborate with multidisciplinary team to address chronic and comorbid conditions and prevent exacerbation or deterioration   Update acute care plan with appropriate goals if chronic or comorbid symptoms are exacerbated and prevent overall improvement and discharge     Problem: Pain  Goal: Verbalizes/displays adequate comfort level or baseline comfort level  12/30/2023 1222 by Twan Fowler, RN  Outcome: Progressing  Flowsheets (Taken 12/30/2023 1222)  Verbalizes/displays adequate comfort level or baseline comfort level:   Encourage patient to monitor pain and request assistance   Administer analgesics based on type and severity of pain and evaluate response   Assess pain using appropriate pain scale   Implement non-pharmacological measures as appropriate and evaluate response   Notify Licensed Independent  Practitioner if interventions unsuccessful or patient reports new pain     Problem: Safety - Adult  Goal: Free from fall injury  12/30/2023 1222 by Twan Fowler, RN  Outcome: Progressing  Flowsheets (Taken 12/30/2023 1222)  Free From Fall Injury: Instruct family/caregiver on patient safety     Problem: ABCDS Injury Assessment  Goal: Absence of physical injury  12/30/2023 1222 by Twan Fowler, RN  Outcome: Progressing  Flowsheets (Taken 12/30/2023 1222)  Absence of Physical Injury: Implement safety measures based on patient assessment     Problem: Skin/Tissue Integrity  Goal: Absence of new skin breakdown  Description: 1.  Monitor for areas of redness and/or skin breakdown  2.  Assess vascular access sites hourly  3.  Every 4-6 hours minimum:  Change oxygen saturation probe site  4.  Every 4-6 hours:  If on nasal continuous positive airway pressure, respiratory therapy assess nares and determine need for appliance change or resting period.  12/30/2023 1222 by Twan Fowler, RN  Outcome: Progressing   Care plan reviewed with patient and family.  Patient and family verbalize understanding of the plan of care and contribute to goal setting.

## 2023-12-30 NOTE — PROGRESS NOTES
Patient refused to wear BiPAP at this time, says that mask can be uncomfortable for her, prefers to use the nasal cannula. Patient maintained on nasal cannula at 5-6lpm. Kept monitored for any respiratory distress.

## 2023-12-30 NOTE — PLAN OF CARE
Problem: Respiratory - Adult  Goal: Clear lung sounds  Outcome: Progressing  Note: Txs to help improve lung aeration. Patient mutually agreed on goals.

## 2023-12-31 ENCOUNTER — TELEPHONE (OUTPATIENT)
Dept: UROLOGY | Age: 68
End: 2023-12-31

## 2023-12-31 LAB
ANION GAP SERPL CALC-SCNC: 7 MEQ/L (ref 8–16)
BACTERIA: ABNORMAL
BASE EXCESS BLDA CALC-SCNC: -4.3 MMOL/L (ref -2–3)
BASOPHILS ABSOLUTE: 0 THOU/MM3 (ref 0–0.1)
BASOPHILS NFR BLD AUTO: 0.2 %
BILIRUB UR QL STRIP: NEGATIVE
BUN SERPL-MCNC: 25 MG/DL (ref 7–22)
CALCIUM SERPL-MCNC: 8.5 MG/DL (ref 8.5–10.5)
CASTS #/AREA URNS LPF: ABNORMAL /LPF
CASTS #/AREA URNS LPF: ABNORMAL /LPF
CHARACTER UR: CLEAR
CHARCOAL URNS QL MICRO: ABNORMAL
CHLORIDE SERPL-SCNC: 113 MEQ/L (ref 98–111)
CO2 SERPL-SCNC: 23 MEQ/L (ref 23–33)
COLLECTED BY:: ABNORMAL
COLOR UR: YELLOW
CREAT SERPL-MCNC: 1.3 MG/DL (ref 0.4–1.2)
CRYSTALS URNS QL MICRO: ABNORMAL
DEPRECATED RDW RBC AUTO: 54.7 FL (ref 35–45)
DEVICE: ABNORMAL
EOSINOPHIL NFR BLD AUTO: 0 %
EOSINOPHILS ABSOLUTE: 0 THOU/MM3 (ref 0–0.4)
EPITHELIAL CELLS, UA: ABNORMAL /HPF
ERYTHROCYTE [DISTWIDTH] IN BLOOD BY AUTOMATED COUNT: 14.7 % (ref 11.5–14.5)
FIO2 ON VENT O2 ANALYZER: 3 %
GFR SERPL CREATININE-BSD FRML MDRD: 45 ML/MIN/1.73M2
GLUCOSE SERPL-MCNC: 124 MG/DL (ref 70–108)
GLUCOSE UR QL STRIP.AUTO: NEGATIVE MG/DL
HCO3 BLDA-SCNC: 22 MMOL/L (ref 23–28)
HCT VFR BLD AUTO: 36.8 % (ref 37–47)
HGB BLD-MCNC: 10.6 GM/DL (ref 12–16)
HGB UR QL STRIP.AUTO: ABNORMAL
HYPOCHROMIA BLD QL SMEAR: PRESENT
IMM GRANULOCYTES # BLD AUTO: 0.03 THOU/MM3 (ref 0–0.07)
IMM GRANULOCYTES NFR BLD AUTO: 0.3 %
KETONES UR QL STRIP.AUTO: NEGATIVE
LEUKOCYTE ESTERASE UR QL STRIP.AUTO: ABNORMAL
LYMPHOCYTES ABSOLUTE: 1.2 THOU/MM3 (ref 1–4.8)
LYMPHOCYTES NFR BLD AUTO: 10.8 %
MCH RBC QN AUTO: 29 PG (ref 26–33)
MCHC RBC AUTO-ENTMCNC: 28.8 GM/DL (ref 32.2–35.5)
MCV RBC AUTO: 100.8 FL (ref 81–99)
MONOCYTES ABSOLUTE: 1 THOU/MM3 (ref 0.4–1.3)
MONOCYTES NFR BLD AUTO: 8.5 %
NEUTROPHILS NFR BLD AUTO: 80.2 %
NITRITE UR QL STRIP.AUTO: NEGATIVE
NRBC BLD AUTO-RTO: 0 /100 WBC
PCO2 TEMP ADJ BLDMV: 44 MMHG (ref 41–51)
PH BLDMV: 7.31 [PH] (ref 7.31–7.41)
PH UR STRIP.AUTO: 5.5 [PH] (ref 5–9)
PLATELET # BLD AUTO: 140 THOU/MM3 (ref 130–400)
PMV BLD AUTO: 11.6 FL (ref 9.4–12.4)
PO2 BLDMV: 37 MMHG (ref 25–40)
POTASSIUM SERPL-SCNC: 4.4 MEQ/L (ref 3.5–5.2)
PROT UR STRIP.AUTO-MCNC: ABNORMAL MG/DL
RBC # BLD AUTO: 3.65 MILL/MM3 (ref 4.2–5.4)
RBC #/AREA URNS HPF: ABNORMAL /HPF
RENAL EPI CELLS #/AREA URNS HPF: ABNORMAL /[HPF]
SAO2 % BLDMV: 66 %
SCAN OF BLOOD SMEAR: NORMAL
SEGMENTED NEUTROPHILS ABSOLUTE COUNT: 9.2 THOU/MM3 (ref 1.8–7.7)
SITE: ABNORMAL
SODIUM SERPL-SCNC: 143 MEQ/L (ref 135–145)
SPECIFIC GRAVITY UA: 1.01 (ref 1–1.03)
UROBILINOGEN, URINE: 0.2 EU/DL (ref 0–1)
WBC # BLD AUTO: 11.5 THOU/MM3 (ref 4.8–10.8)
WBC #/AREA URNS HPF: ABNORMAL /HPF
YEAST LIKE FUNGI URNS QL MICRO: ABNORMAL

## 2023-12-31 PROCEDURE — 1200000000 HC SEMI PRIVATE

## 2023-12-31 PROCEDURE — 94761 N-INVAS EAR/PLS OXIMETRY MLT: CPT

## 2023-12-31 PROCEDURE — 36600 WITHDRAWAL OF ARTERIAL BLOOD: CPT

## 2023-12-31 PROCEDURE — 99233 SBSQ HOSP IP/OBS HIGH 50: CPT | Performed by: INTERNAL MEDICINE

## 2023-12-31 PROCEDURE — 6360000002 HC RX W HCPCS: Performed by: UROLOGY

## 2023-12-31 PROCEDURE — 6370000000 HC RX 637 (ALT 250 FOR IP): Performed by: INTERNAL MEDICINE

## 2023-12-31 PROCEDURE — 87086 URINE CULTURE/COLONY COUNT: CPT

## 2023-12-31 PROCEDURE — 2700000000 HC OXYGEN THERAPY PER DAY

## 2023-12-31 PROCEDURE — 94640 AIRWAY INHALATION TREATMENT: CPT

## 2023-12-31 PROCEDURE — 80048 BASIC METABOLIC PNL TOTAL CA: CPT

## 2023-12-31 PROCEDURE — 2580000003 HC RX 258: Performed by: INTERNAL MEDICINE

## 2023-12-31 PROCEDURE — 85025 COMPLETE CBC W/AUTO DIFF WBC: CPT

## 2023-12-31 PROCEDURE — 6370000000 HC RX 637 (ALT 250 FOR IP): Performed by: UROLOGY

## 2023-12-31 PROCEDURE — 36415 COLL VENOUS BLD VENIPUNCTURE: CPT

## 2023-12-31 PROCEDURE — 81001 URINALYSIS AUTO W/SCOPE: CPT

## 2023-12-31 PROCEDURE — 82803 BLOOD GASES ANY COMBINATION: CPT

## 2023-12-31 PROCEDURE — 99024 POSTOP FOLLOW-UP VISIT: CPT | Performed by: NURSE PRACTITIONER

## 2023-12-31 PROCEDURE — 6360000002 HC RX W HCPCS: Performed by: INTERNAL MEDICINE

## 2023-12-31 PROCEDURE — 94660 CPAP INITIATION&MGMT: CPT

## 2023-12-31 RX ORDER — PREDNISONE 20 MG/1
20 TABLET ORAL DAILY
Status: DISCONTINUED | OUTPATIENT
Start: 2023-12-31 | End: 2024-01-01 | Stop reason: HOSPADM

## 2023-12-31 RX ORDER — DOCUSATE SODIUM 100 MG/1
100 CAPSULE, LIQUID FILLED ORAL 2 TIMES DAILY
Qty: 60 CAPSULE | Refills: 0 | Status: SHIPPED | OUTPATIENT
Start: 2023-12-31 | End: 2024-01-30

## 2023-12-31 RX ORDER — PREDNISONE 20 MG/1
20 TABLET ORAL 2 TIMES DAILY
Qty: 10 TABLET | Refills: 0 | Status: SHIPPED | OUTPATIENT
Start: 2023-12-31 | End: 2024-01-05

## 2023-12-31 RX ORDER — CEPHALEXIN 500 MG/1
500 CAPSULE ORAL 2 TIMES DAILY
Qty: 14 CAPSULE | Refills: 0 | Status: SHIPPED | OUTPATIENT
Start: 2023-12-31 | End: 2024-01-07

## 2023-12-31 RX ORDER — OXYCODONE HYDROCHLORIDE 5 MG/1
5 TABLET ORAL EVERY 6 HOURS PRN
Qty: 15 TABLET | Refills: 0 | Status: SHIPPED | OUTPATIENT
Start: 2023-12-31 | End: 2024-01-03

## 2023-12-31 RX ORDER — AMOXICILLIN AND CLAVULANATE POTASSIUM 875; 125 MG/1; MG/1
1 TABLET, FILM COATED ORAL EVERY 12 HOURS SCHEDULED
Status: DISCONTINUED | OUTPATIENT
Start: 2023-12-31 | End: 2023-12-31

## 2023-12-31 RX ORDER — SODIUM CHLORIDE 9 MG/ML
INJECTION, SOLUTION INTRAVENOUS CONTINUOUS
Status: ACTIVE | OUTPATIENT
Start: 2023-12-31 | End: 2024-01-01

## 2023-12-31 RX ORDER — ACETAMINOPHEN 500 MG
1000 TABLET ORAL EVERY 6 HOURS PRN
Status: DISCONTINUED | OUTPATIENT
Start: 2023-12-31 | End: 2024-01-01 | Stop reason: HOSPADM

## 2023-12-31 RX ORDER — AZITHROMYCIN 500 MG/1
500 TABLET, FILM COATED ORAL DAILY
Qty: 3 TABLET | Refills: 0 | Status: SHIPPED | OUTPATIENT
Start: 2023-12-31 | End: 2024-01-03

## 2023-12-31 RX ADMIN — ENOXAPARIN SODIUM 40 MG: 100 INJECTION SUBCUTANEOUS at 09:30

## 2023-12-31 RX ADMIN — BACLOFEN 10 MG: 10 TABLET ORAL at 09:30

## 2023-12-31 RX ADMIN — CEFTRIAXONE SODIUM 1000 MG: 1 INJECTION, POWDER, FOR SOLUTION INTRAMUSCULAR; INTRAVENOUS at 13:38

## 2023-12-31 RX ADMIN — IPRATROPIUM BROMIDE AND ALBUTEROL SULFATE 1 DOSE: .5; 3 SOLUTION RESPIRATORY (INHALATION) at 17:24

## 2023-12-31 RX ADMIN — IPRATROPIUM BROMIDE AND ALBUTEROL SULFATE 1 DOSE: .5; 3 SOLUTION RESPIRATORY (INHALATION) at 07:28

## 2023-12-31 RX ADMIN — OXYBUTYNIN CHLORIDE 10 MG: 10 TABLET, EXTENDED RELEASE ORAL at 09:30

## 2023-12-31 RX ADMIN — SODIUM CHLORIDE: 9 INJECTION, SOLUTION INTRAVENOUS at 21:54

## 2023-12-31 RX ADMIN — PREDNISONE 20 MG: 20 TABLET ORAL at 13:32

## 2023-12-31 RX ADMIN — SODIUM CHLORIDE: 9 INJECTION, SOLUTION INTRAVENOUS at 13:34

## 2023-12-31 RX ADMIN — AZITHROMYCIN MONOHYDRATE 500 MG: 500 INJECTION, POWDER, LYOPHILIZED, FOR SOLUTION INTRAVENOUS at 16:24

## 2023-12-31 ASSESSMENT — PAIN SCALES - GENERAL: PAINLEVEL_OUTOF10: 0

## 2023-12-31 NOTE — PROGRESS NOTES
Florence Fiore, APRN - CNP  Urology Progress Note    Subjective: Meka Bang is a 68 y.o. female.    s/p ROBOTIC LAPAROSCOPIC  LEFT PARTIAL NEPHRECTOMY on 12/29/2023    His/Her current Diet is: ADULT DIET; Regular.    Since the previous note, the patient reports the following:  No acute issues overnight.  No fevers or chills.  No nausea or vomiting.  No chest pain or shortness of breath.  No calf pain.  Pain Controlled.  Ambulating.  Tolerating PO Diet.    Patient feeling well today. Voiding good UOP spontaneously.   She is still requiring oxygen supplementation. On 3L NC this morning, no respiratory distress noted. Patient appears comfortable.     Vitals and Labs:  Patient Vitals for the past 24 hrs:   BP Temp Temp src Pulse Resp SpO2   12/31/23 0728 -- -- -- -- -- 94 %   12/31/23 0400 -- -- -- -- -- 94 %   12/31/23 0315 126/75 97.9 °F (36.6 °C) Axillary 86 18 95 %   12/30/23 1945 120/67 98.6 °F (37 °C) Axillary 82 16 94 %   12/30/23 1756 -- -- -- -- -- 91 %   12/30/23 1754 -- -- -- -- -- (!) 88 %   12/30/23 1541 125/69 97.8 °F (36.6 °C) Oral 83 -- 92 %   12/30/23 0918 -- -- -- -- -- 95 %   12/30/23 0900 (!) 114/54 98.9 °F (37.2 °C) Oral 93 18 97 %     I/O last 3 completed shifts:  In: 160 [P.O.:160]  Out: 1250 [Urine:1250]    Recent Labs     12/29/23  1655 12/30/23  0451 12/31/23  0611   WBC 16.1* 13.2* 11.5*   HGB 13.3 12.0 10.6*   HCT 45.0 41.1 36.8*   MCV 98.5 100.2* 100.8*    147 140     Recent Labs     12/29/23  1655 12/30/23 0451 12/31/23  0611    141 143   K 4.7 4.9 4.4    109 113*   CO2 24 22* 23   BUN 18 24* 25*   CREATININE 1.2 1.5* 1.3*       No results for input(s): \"COLORU\", \"PHUR\", \"LABCAST\", \"WBCUA\", \"RBCUA\", \"MUCUS\", \"TRICHOMONAS\", \"YEAST\", \"BACTERIA\", \"CLARITYU\", \"SPECGRAV\", \"LEUKOCYTESUR\", \"UROBILINOGEN\", \"BILIRUBINUR\", \"BLOODU\" in the last 72 hours.    Invalid input(s): \"NITRATE\", \"GLUCOSEUKETONESUAMORPHOUS\"    Physical Exam:  No acute distress. Awake, alert

## 2023-12-31 NOTE — PROGRESS NOTES
Hospitalist Progress Note    Patient:  Meka Bang      Unit/Bed:5K-10/010-A    YOB: 1955    MRN: [id number]       Acct: [id number]     PCP: Amanda Monterroso APRN - NP    Date of Admission: 12/29/2023    Active Hospital Problems    Diagnosis Date Noted    Left renal mass [N28.89] 12/29/2023     Assessment/Plan:    # Left Renal Mass s/p Left Partial Nephrectomy on 12/29  - Mgmt per primary team urology     # Post Operative Respiratory Insuffiencey with Possible PNA   - post operative requiring 6L NC  - when asleep pulse Ox drops to low 80's, does well on 2L NC during day  - 2V CXR showing opacities at the lung bases which could be due to atelectasis or infiltrate   - start prednisone 20 mg daily, IV Rocephin and Azithro, and Duo-nebs  - CPAP nightly ordered   - home O2 eval     # AMS: seems to be more delirium, hospital acquired and/or medication related. Stop narcotics, baclofen. Start IVF hydration.       # Post Operative Blood Loss Anemia: Hb baseline 13.0, dropped to 10.6. No evidence of gross bleeding (melena, hematochezia, hematuria). Cont to trend daily. Transfuse if Hb < 8.     # CKD Stage III  - Creatinine 1.2 - baseline around 1  - Continue to monitor kidney status    # nicotine dependence  - Can offer nicotine patch  - 50 pack year history     # CAD  ?Hx of MI - patient disputed this on assessment  - Resume aspirin post operatively          Chief Complaint: renal mass     Hospital Course: Meka Bang is a 68 y.o. female who we are asked to see/evaluate by Omar Schwarz MD for medical management of acute and chronic medical conditions. Patient was resting comfortably in bed during assessment. She was trialed on CPAP for post operative respiratory support but could tolerate it and endorses history of intolerance to CPAP device so she was placed on 6L NC while recovering from anesthesia. She denies any chest pain, dyspnea, numbness and tingling, nausea,  dizziness  She had no other complaints at this time.     Subjective: no acute events overnight. Patient seems to be more confused today, comes and goes. Family concerned. Otherwise HD stable, no fevers. Tolerating PO intake.       Medications:  Reviewed    Infusion Medications    sodium chloride      sodium chloride       Scheduled Medications    predniSONE  20 mg Oral Daily    amoxicillin-clavulanate  1 tablet Oral 2 times per day    ipratropium 0.5 mg-albuterol 2.5 mg  1 Dose Inhalation BID RT    oxyBUTYnin  10 mg Oral Daily    vitamin D  50,000 Units Oral Weekly    sodium chloride flush  5-40 mL IntraVENous 2 times per day    enoxaparin  40 mg SubCUTAneous Daily     PRN Meds: acetaminophen, albuterol, sodium chloride flush, sodium chloride, ondansetron **OR** ondansetron, promethazine      Intake/Output Summary (Last 24 hours) at 12/31/2023 1257  Last data filed at 12/31/2023 0348  Gross per 24 hour   Intake 110 ml   Output --   Net 110 ml         Diet:  ADULT DIET; Regular    Exam:  /80   Pulse 80   Temp 99.1 °F (37.3 °C) (Axillary)   Resp 16   Ht 1.664 m (5' 5.5\")   Wt 91.6 kg (202 lb)   SpO2 91%   BMI 33.10 kg/m²     General appearance: No apparent distress, appears stated age.  Eyes:  Pupils equal, round, and reactive to light. Conjunctivae/corneas clear.  HENT: Head normal in appearance. External nares normal.  Oral mucosa moist without lesions.  Hearing grossly intact.   Neck: Supple, with full range of motion. Trachea midline.  No gross JVD appreciated.  Respiratory:  Nasal cannula in place Normal respiratory effort. Some diminished breath sounds   Cardiovascular: Normal rate, regular rhythm with normal S1/S2 without murmurs.    No lower extremity edema.   Abdomen: post operative expected tenderness but pain within normal limits and well controlled   Musculoskeletal: No joint swelling or tenderness. Normal tone. No abnormal movements.   Skin: Warm and dry. No rashes or lesions.  Neurologic:

## 2023-12-31 NOTE — RT PROTOCOL NOTE
RT Inhaler-Nebulizer Bronchodilator Protocol Note    There is a bronchodilator order in the chart from a provider indicating to follow the RT Bronchodilator Protocol and there is an “Initiate RT Inhaler-Nebulizer Bronchodilator Protocol” order as well (see protocol at bottom of note).    CXR Findings:  No results found.    The findings from the last RT Protocol Assessment were as follows:   History Pulmonary Disease: Smoker 15 pack years or more  Respiratory Pattern: Dyspnea on exertion or RR 21-25 bpm  Breath Sounds: Slightly diminished and/or crackles  Cough: Strong, spontaneous, non-productive  Indication for Bronchodilator Therapy: Decreased or absent breath sounds  Bronchodilator Assessment Score: 5    Aerosolized bronchodilator medication orders have been revised according to the RT Inhaler-Nebulizer Bronchodilator Protocol below.    Respiratory Therapist to perform RT Therapy Protocol Assessment initially then follow the protocol.  Repeat RT Therapy Protocol Assessment PRN for score 0-3 or on second treatment, BID, and PRN for scores above 3.    No Indications - adjust the frequency to every 6 hours PRN wheezing or bronchospasm, if no treatments needed after 48 hours then discontinue using Per Protocol order mode.     If indication present, adjust the RT bronchodilator orders based on the Bronchodilator Assessment Score as indicated below.  Use Inhaler orders unless patient has one or more of the following: on home nebulizer, not able to hold breath for 10 seconds, is not alert and oriented, cannot activate and use MDI correctly, or respiratory rate 25 breaths per minute or more, then use the equivalent nebulizer order(s) with same Frequency and PRN reasons based on the score.  If a patient is on this medication at home then do not decrease Frequency below that used at home.    0-3 - enter or revise RT bronchodilator order(s) to equivalent RT Bronchodilator order with Frequency of every 4 hours PRN for

## 2023-12-31 NOTE — PLAN OF CARE
Problem: Discharge Planning  Goal: Discharge to home or other facility with appropriate resources  12/30/2023 2041 by Melly Flaherty RN  Outcome: Progressing     Problem: Chronic Conditions and Co-morbidities  Goal: Patient's chronic conditions and co-morbidity symptoms are monitored and maintained or improved  12/30/2023 2041 by Melly Flaherty RN  Outcome: Progressing     Problem: Pain  Goal: Verbalizes/displays adequate comfort level or baseline comfort level  12/30/2023 2041 by Melly Flaherty RN  Outcome: Progressing   Pain Assessment: None - Denies Pain  Pain Level: 0   Patient's Stated Pain Goal: 2   Is pain goal met at this time?  Yes     Non-Pharmaceutical Pain Intervention(s): Rest    Problem: Safety - Adult  Goal: Free from fall injury  12/30/2023 2041 by Melly Flaherty RN  Outcome: Progressing   All fall precautions in place. Bed in low position, alarm activated and appropriate use of call light.     Problem: ABCDS Injury Assessment  Goal: Absence of physical injury  12/30/2023 2041 by Melly Flaherty RN  Outcome: Progressing     Problem: Skin/Tissue Integrity  Goal: Absence of new skin breakdown  Description: 1.  Monitor for areas of redness and/or skin breakdown  2.  Assess vascular access sites hourly  3.  Every 4-6 hours minimum:  Change oxygen saturation probe site  4.  Every 4-6 hours:  If on nasal continuous positive airway pressure, respiratory therapy assess nares and determine need for appliance change or resting period.  12/30/2023 2041 by Melly Flaherty RN  Outcome: Progressing     Problem: Respiratory - Adult  Goal: Clear lung sounds  12/30/2023 2041 by Melly Flaherty RN  Outcome: Progressing     Care plan reviewed with patient and family.  Patient and family verbalize understanding of the plan of care and contribute to goal setting.

## 2023-12-31 NOTE — TELEPHONE ENCOUNTER
Patient is s/p robotic left partial nephrectomy by Dr. Schwarz 12/29/2023. Will need OV in 1 week for path results.
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
## 2023-12-31 NOTE — PLAN OF CARE
Problem: Respiratory - Adult  Goal: Clear lung sounds  12/31/2023 0731 by Liv Alex, RCP  Outcome: Progressing

## 2023-12-31 NOTE — PLAN OF CARE
Discharge canceled for today. Family reporting patient is more confused. Oriented per RN. Dr. Manning aware and into evaluate patient.

## 2023-12-31 NOTE — PROGRESS NOTES
I have recommended that this patient to ambulate but she declines at this time. I have discussed the risks and benefits of this  with her. The patient verbalizes understanding.

## 2023-12-31 NOTE — PROGRESS NOTES
A home oxygen evaluation has been completed.     [x]Patient is an inpatient. It is expected that the patient will be discharged within the next 48 hours. Qualified provider to write order for home prescription if patient qualifies. Social service/care managers will arrange for home oxygen.  If patient is active, arrange for Home Medical supplier to assess for Oxygen Conserving Device per pulse oximetry.  []Patient is an outpatient. Results will be faxed to the ordering provider. Qualified provider to write order for home prescription if patient qualifies and arranges for home oxygen.      Patient was placed on room air for 20 minutes. SpO2 was 86 % on room air at rest. Patients SpO2 was below 89% and qualified for home oxygen. Oxygen was applied at 3 lpm via nasal cannula to maintain a SpO2 between 90-92% while at rest. Actual SpO2 was 93 %. Patient can ambulate for exercise flow rate. Patients was ambulated, SpO2 was 93% on 3 lpm to maintain SpO2 between 90-92% while exercising.    RN aware    Note: For any SpO2 at 89% see policy and procedure for possible qualifications.

## 2023-12-31 NOTE — PROGRESS NOTES
12/31/23 0732   RT Protocol   History Pulmonary Disease 1   Respiratory pattern 2   Breath sounds 2   Cough 0   Indications for Bronchodilator Therapy Decreased or absent breath sounds   Bronchodilator Assessment Score 5

## 2024-01-01 VITALS
TEMPERATURE: 98 F | HEIGHT: 66 IN | WEIGHT: 209.4 LBS | SYSTOLIC BLOOD PRESSURE: 135 MMHG | HEART RATE: 88 BPM | DIASTOLIC BLOOD PRESSURE: 81 MMHG | RESPIRATION RATE: 18 BRPM | OXYGEN SATURATION: 94 % | BODY MASS INDEX: 33.65 KG/M2

## 2024-01-01 LAB
ANION GAP SERPL CALC-SCNC: 7 MEQ/L (ref 8–16)
BACTERIA UR CULT: ABNORMAL
BASOPHILS ABSOLUTE: 0 THOU/MM3 (ref 0–0.1)
BASOPHILS NFR BLD AUTO: 0.2 %
BUN SERPL-MCNC: 21 MG/DL (ref 7–22)
CALCIUM SERPL-MCNC: 8.8 MG/DL (ref 8.5–10.5)
CHLORIDE SERPL-SCNC: 111 MEQ/L (ref 98–111)
CO2 SERPL-SCNC: 25 MEQ/L (ref 23–33)
CREAT SERPL-MCNC: 1.1 MG/DL (ref 0.4–1.2)
DEPRECATED RDW RBC AUTO: 53.1 FL (ref 35–45)
EOSINOPHIL NFR BLD AUTO: 0.2 %
EOSINOPHILS ABSOLUTE: 0 THOU/MM3 (ref 0–0.4)
ERYTHROCYTE [DISTWIDTH] IN BLOOD BY AUTOMATED COUNT: 14.3 % (ref 11.5–14.5)
GFR SERPL CREATININE-BSD FRML MDRD: 55 ML/MIN/1.73M2
GLUCOSE SERPL-MCNC: 103 MG/DL (ref 70–108)
HCT VFR BLD AUTO: 35.1 % (ref 37–47)
HGB BLD-MCNC: 10.1 GM/DL (ref 12–16)
HYPOCHROMIA BLD QL SMEAR: PRESENT
IMM GRANULOCYTES # BLD AUTO: 0.07 THOU/MM3 (ref 0–0.07)
IMM GRANULOCYTES NFR BLD AUTO: 0.6 %
LYMPHOCYTES ABSOLUTE: 2 THOU/MM3 (ref 1–4.8)
LYMPHOCYTES NFR BLD AUTO: 18.1 %
MAGNESIUM SERPL-MCNC: 2.1 MG/DL (ref 1.6–2.4)
MCH RBC QN AUTO: 29.1 PG (ref 26–33)
MCHC RBC AUTO-ENTMCNC: 28.8 GM/DL (ref 32.2–35.5)
MCV RBC AUTO: 101.2 FL (ref 81–99)
MONOCYTES ABSOLUTE: 0.9 THOU/MM3 (ref 0.4–1.3)
MONOCYTES NFR BLD AUTO: 8 %
NEUTROPHILS NFR BLD AUTO: 72.9 %
NRBC BLD AUTO-RTO: 0 /100 WBC
ORGANISM: ABNORMAL
PLATELET # BLD AUTO: 131 THOU/MM3 (ref 130–400)
PMV BLD AUTO: 11.4 FL (ref 9.4–12.4)
POTASSIUM SERPL-SCNC: 4.5 MEQ/L (ref 3.5–5.2)
RBC # BLD AUTO: 3.47 MILL/MM3 (ref 4.2–5.4)
SEGMENTED NEUTROPHILS ABSOLUTE COUNT: 8.1 THOU/MM3 (ref 1.8–7.7)
SODIUM SERPL-SCNC: 143 MEQ/L (ref 135–145)
WBC # BLD AUTO: 11.1 THOU/MM3 (ref 4.8–10.8)

## 2024-01-01 PROCEDURE — 6360000002 HC RX W HCPCS: Performed by: UROLOGY

## 2024-01-01 PROCEDURE — 2580000003 HC RX 258: Performed by: INTERNAL MEDICINE

## 2024-01-01 PROCEDURE — 2700000000 HC OXYGEN THERAPY PER DAY

## 2024-01-01 PROCEDURE — 6370000000 HC RX 637 (ALT 250 FOR IP): Performed by: INTERNAL MEDICINE

## 2024-01-01 PROCEDURE — 85025 COMPLETE CBC W/AUTO DIFF WBC: CPT

## 2024-01-01 PROCEDURE — 36415 COLL VENOUS BLD VENIPUNCTURE: CPT

## 2024-01-01 PROCEDURE — 99232 SBSQ HOSP IP/OBS MODERATE 35: CPT | Performed by: INTERNAL MEDICINE

## 2024-01-01 PROCEDURE — 99024 POSTOP FOLLOW-UP VISIT: CPT | Performed by: NURSE PRACTITIONER

## 2024-01-01 PROCEDURE — 83735 ASSAY OF MAGNESIUM: CPT

## 2024-01-01 PROCEDURE — 6370000000 HC RX 637 (ALT 250 FOR IP): Performed by: UROLOGY

## 2024-01-01 PROCEDURE — 80048 BASIC METABOLIC PNL TOTAL CA: CPT

## 2024-01-01 PROCEDURE — 94640 AIRWAY INHALATION TREATMENT: CPT

## 2024-01-01 RX ADMIN — PREDNISONE 20 MG: 20 TABLET ORAL at 09:21

## 2024-01-01 RX ADMIN — ENOXAPARIN SODIUM 40 MG: 100 INJECTION SUBCUTANEOUS at 09:21

## 2024-01-01 RX ADMIN — OXYBUTYNIN CHLORIDE 10 MG: 10 TABLET, EXTENDED RELEASE ORAL at 09:21

## 2024-01-01 RX ADMIN — ACETAMINOPHEN 1000 MG: 500 TABLET ORAL at 00:37

## 2024-01-01 RX ADMIN — SODIUM CHLORIDE: 9 INJECTION, SOLUTION INTRAVENOUS at 09:19

## 2024-01-01 RX ADMIN — IPRATROPIUM BROMIDE AND ALBUTEROL SULFATE 1 DOSE: .5; 3 SOLUTION RESPIRATORY (INHALATION) at 08:39

## 2024-01-01 ASSESSMENT — PAIN DESCRIPTION - LOCATION: LOCATION: HEAD

## 2024-01-01 ASSESSMENT — PAIN SCALES - GENERAL: PAINLEVEL_OUTOF10: 9

## 2024-01-01 ASSESSMENT — PAIN DESCRIPTION - ORIENTATION: ORIENTATION: MID

## 2024-01-01 ASSESSMENT — PAIN DESCRIPTION - DESCRIPTORS: DESCRIPTORS: ACHING

## 2024-01-01 NOTE — PLAN OF CARE
Problem: Discharge Planning  Goal: Discharge to home or other facility with appropriate resources  Outcome: Completed     Problem: Chronic Conditions and Co-morbidities  Goal: Patient's chronic conditions and co-morbidity symptoms are monitored and maintained or improved  Outcome: Completed     Problem: Pain  Goal: Verbalizes/displays adequate comfort level or baseline comfort level  Outcome: Completed     Problem: Safety - Adult  Goal: Free from fall injury  Outcome: Completed     Problem: ABCDS Injury Assessment  Goal: Absence of physical injury  Outcome: Completed     Problem: Skin/Tissue Integrity  Goal: Absence of new skin breakdown  Description: 1.  Monitor for areas of redness and/or skin breakdown  2.  Assess vascular access sites hourly  3.  Every 4-6 hours minimum:  Change oxygen saturation probe site  4.  Every 4-6 hours:  If on nasal continuous positive airway pressure, respiratory therapy assess nares and determine need for appliance change or resting period.  Outcome: Completed     Problem: Respiratory - Adult  Goal: Clear lung sounds  Outcome: Completed

## 2024-01-01 NOTE — PROGRESS NOTES
1245- Patient insurance is not in network for [employer], called [facility] and was able to get patient home O2 set up and at 1430 they dropped off her home O2 tank.     1700-DC instructions discussed with patient and family present at bedside. No questions asked. Patient was dc'd home with O2 tank and all belongings.

## 2024-01-01 NOTE — PROGRESS NOTES
Hospitalist Progress Note    Patient:  Meka Bang      Unit/Bed:5K-10/010-A    YOB: 1955    MRN: [id number]       Acct: [id number]     PCP: Amanda Monterroso APRN - NP    Date of Admission: 12/29/2023    Active Hospital Problems    Diagnosis Date Noted    Left renal mass [N28.89] 12/29/2023     Assessment/Plan:    # Left Renal Mass s/p Left Partial Nephrectomy on 12/29  - Mgmt per primary team urology     # Post Operative Respiratory Insuffiencey with Possible PNA   - post operative requiring 6L NC  - when asleep pulse Ox drops to low 80's, does well on 2L NC during day  - 2V CXR showing opacities at the lung bases which could be due to atelectasis or infiltrate   - start prednisone 20 mg daily, IV Rocephin and Azithro, and Duo-nebs. Improved. Transitioned to PO Azithromycin.   - patient qualified for home O2 (3L at rest and 3L with activity)     # Post Operative Blood Loss Anemia: Hb baseline 13.0, dropped to 10.6. No evidence of gross bleeding (melena, hematochezia, hematuria). Cont to trend daily. Transfuse if Hb < 8.     # CKD Stage III  - Creatinine 1.2 - baseline around 1  - Continue to monitor kidney status    # nicotine dependence  - Can offer nicotine patch  - 50 pack year history     # CAD  ?Hx of MI - patient disputed this on assessment  - Resume aspirin post operatively     Patient was evaluated today for the diagnosis of COPD, ANGELO .  I entered a DME order for home oxygen at 3 lpm because the diagnosis and testing require the patient to have supplemental oxygen.  Condition will improve or be benefited by oxygen use.  The patient is  able to perform good mobility in a home setting and therefore does require the use of a portable oxygen system.  The need for this equipment was discussed with the patient and she understands and is in agreement.           Chief Complaint: renal mass     Hospital Course: Meka Bang is a 68 y.o. female who we are asked to
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
## 2024-01-01 NOTE — DISCHARGE INSTRUCTIONS
No heavy lifting or strenuous activity for 6 weeks.  Avoid driving while on analgesics.       Finish antibiotics     Pt ok to discharge home in good condition  Pt should walk moderately at home  Pt ok to shower   Pt may resume diet as tolerated  Pt should take Rx as directed  No driving while on narcotics  Please call attending physician or hospital  with questions  Call or Present to ED if fever (> 101F), intractable nausea vomiting or pain.  Rx in chart

## 2024-01-02 NOTE — PROGRESS NOTES
[facility] [place] [facility]  [facility]  [address]  [address]  [facility] [address]  Dept: [phone]  Loc: [phone]    Visit Date: 1/8/2024        HPI:     Meka Bang is a 68 y.o. female who presents today for:  Chief Complaint   Patient presents with    Other     Renal cell carcinoma of left kidney    Follow-up     ROBOTIC LAPAROSCOPIC  LEFT PARTIAL NEPHRECTOMY    Results     Rv path       HPI  Patient presents to urology clinic for post-operative follow-up.     Meka is s/p robotic laparoscopic left partial nephrectomy by Dr. Schwarz on 12/29/2023.    FINAL DIAGNOSIS:   Left kidney mass, partial nephrectomy:    Chromophobe renal cell carcinoma.    Invades perirenal fat.    Margins free.   pT3a       Left renal mass  Reviewed MRI and CT report  No images seen  4.5 cm enhancing renal mass  Possibly seen in 2020     OAB  On ditropan  Stable    Current Outpatient Medications   Medication Sig Dispense Refill    varenicline (CHANTIX) 0.5 MG tablet Take 1 tablet by mouth 2 times daily      oxyCODONE-acetaminophen (PERCOCET) 5-325 MG per tablet Take 1 tablet by mouth every 4 hours as needed for Pain. Max Daily Amount: 6 tablets      docusate sodium (COLACE) 100 MG capsule Take 1 capsule by mouth 2 times daily 60 capsule 0    vitamin D (ERGOCALCIFEROL) 1.25 MG (71696 UT) CAPS capsule Take 1 capsule by mouth once a week 12 capsule 3    oxybutynin (DITROPAN-XL) 10 MG extended release tablet Take 1 tablet by mouth daily      aspirin 81 MG EC tablet Take 1 tablet by mouth daily      baclofen (LIORESAL) 10 MG tablet Take 1 tablet by mouth 3 times daily (Patient not taking: Reported on 1/8/2024)       No current facility-administered medications for this visit.       Past Medical History  Meka  has a past medical history of Arthritis, Cancer (HCC), Chest pain, GERD (gastroesophageal reflux disease), Kidney stones, MI (myocardial infarction) (HCC), and Migraine.    Past

## 2024-01-03 NOTE — OP NOTE
Operative Note      Patient: Meka Bang  YOB: 1955  MRN: [id number]    Date of Procedure: 12/29/2023    Pre-Op Diagnosis Codes:     * Left renal mass [N28.89]    Post-Op Diagnosis: Same       Procedure(s):  ROBOTIC LAPAROSCOPIC ASSISTED  LEFT PARTIAL NEPHRECTOMY    Surgeon(s):  Omar Schwarz MD    Assistant:   First Assistant: Coleen Medley RN    Anesthesia: General    Estimated Blood Loss (mL): Minimal    Complications: None    Specimens:   ID Type Source Tests Collected by Time Destination   A : Left Renal Mass Tissue Kidney SURGICAL PATHOLOGY Omar Schwarz MD 12/29/2023 1001        Implants:  Implant Name Type Inv. Item Serial No.  Lot No. LRB No. Used Action   CLIP INT L POLYMER ALEX LIG HEM O ALEX (6EA/PK) - [id number]  CLIP INT L POLYMER ALEX LIG HEM O ALEX (6EA/PK)  [employer]  Left 4 Implanted         Drains:   [REMOVED] Urinary Catheter 12/29/23 Elias-Temperature (Removed)   Catheter Indications Perioperative use for selected surgical procedures 12/29/23 2000   Site Assessment No urethral drainage 12/29/23 2000   Urine Color Christianne 12/30/23 0905   Urine Appearance Clear 12/30/23 0905   Collection Container Standard 12/30/23 0905   Securement Method Leg strap 12/30/23 0905   Catheter Care  Perineal wipes 12/29/23 2050   Catheter Best Practices  Drainage tube clipped to bed;Catheter secured to thigh;Tamper seal intact;Bag below bladder;Bag not on floor;Lack of dependent loop in tubing;Drainage bag less than half full 12/30/23 0905   Status Draining 12/30/23 0905   Output (mL) 300 mL 12/30/23 1100       Findings: left lower pole renal mass, large mass; no gross spillage or margins upon resection        Detailed Description of Procedure:        All treatment options were discussed. Risks, benefits, goals, alternatives, possible complications were discussed with patient. Patient elected for above mentioned procedures. Consent was signed. Patient elected to

## 2024-01-08 ENCOUNTER — OFFICE VISIT (OUTPATIENT)
Dept: UROLOGY | Age: 69
End: 2024-01-08

## 2024-01-08 VITALS
WEIGHT: 209 LBS | DIASTOLIC BLOOD PRESSURE: 78 MMHG | BODY MASS INDEX: 33.59 KG/M2 | HEIGHT: 66 IN | SYSTOLIC BLOOD PRESSURE: 140 MMHG

## 2024-01-08 DIAGNOSIS — N32.81 OAB (OVERACTIVE BLADDER): ICD-10-CM

## 2024-01-08 DIAGNOSIS — C64.2 RENAL CELL CARCINOMA OF LEFT KIDNEY (HCC): ICD-10-CM

## 2024-01-08 DIAGNOSIS — R06.83 SNORING: Primary | ICD-10-CM

## 2024-01-08 DIAGNOSIS — C64.2 RENAL CELL CARCINOMA OF LEFT KIDNEY (HCC): Primary | ICD-10-CM

## 2024-01-08 DIAGNOSIS — G47.30 SLEEP APNEA, UNSPECIFIED TYPE: ICD-10-CM

## 2024-01-08 PROCEDURE — 99024 POSTOP FOLLOW-UP VISIT: CPT | Performed by: NURSE PRACTITIONER

## 2024-01-08 RX ORDER — VARENICLINE TARTRATE 0.5 MG/1
0.5 TABLET, FILM COATED ORAL 2 TIMES DAILY
COMMUNITY

## 2024-01-08 RX ORDER — OXYCODONE HYDROCHLORIDE AND ACETAMINOPHEN 5; 325 MG/1; MG/1
1 TABLET ORAL EVERY 4 HOURS PRN
COMMUNITY

## 2024-01-10 ENCOUNTER — SOCIAL WORK (OUTPATIENT)
Dept: INFUSION THERAPY | Age: 69
End: 2024-01-10

## 2024-01-10 NOTE — PROGRESS NOTES
Oncology Social Work    Date: 1/10/2024  Time: 2:46 PM  Name: Meka Bang  MRN: [id number]     Contact Type: Follow-up    Note:   Situation: This staff called Meka Bang via phone support to re-introduce myself as her Oncology Social Worker.     Background:  Meka and this staff had spoken previously (Nov 2023) but at that time she wasn't sure if she had cancer. She was undergoing additional testing to determine. Today her name resurfaced through the General Surgeon's office. She had her recent appointment with there and this staff was calling to review if she had any outstanding concerns identified on her coping thermometer.     Assessment:  The contact number provided to this staff and Medical Records is her number and it was identified as such in the voicemail recording. Since the call went immediately to a voicemail, a message was left regarding the purpose of the call.   - Education regarding the services was provided on the recorded message from the SW.  - No community referrals were placed at this time because this staff has no indication of where Meka is in her treatment plan. Referral services may be reconsidered should she express needs regarding assistance.    Recommendation: Follow-up will be initiated by Meka based on need should she choose to return a call to the identified number.  was able to provide her with my contact information and will remain available for support.       MIGUEL King, BERNARD, CHIDI  Oncology Social Worker      Electronically signed by MIGUEL King LSW, CHIDI on 1/10/2024 at 2:46 PM

## 2024-01-11 ENCOUNTER — APPOINTMENT (OUTPATIENT)
Dept: CT IMAGING | Age: 69
End: 2024-01-11
Payer: MEDICARE

## 2024-01-11 ENCOUNTER — APPOINTMENT (OUTPATIENT)
Dept: INTERVENTIONAL RADIOLOGY/VASCULAR | Age: 69
End: 2024-01-11
Payer: MEDICARE

## 2024-01-11 ENCOUNTER — APPOINTMENT (OUTPATIENT)
Dept: GENERAL RADIOLOGY | Age: 69
End: 2024-01-11
Payer: MEDICARE

## 2024-01-11 ENCOUNTER — HOSPITAL ENCOUNTER (EMERGENCY)
Age: 69
Discharge: HOME | End: 2024-01-11
Attending: EMERGENCY MEDICINE
Payer: MEDICARE

## 2024-01-11 VITALS
HEART RATE: 90 BPM | TEMPERATURE: 97.8 F | DIASTOLIC BLOOD PRESSURE: 85 MMHG | SYSTOLIC BLOOD PRESSURE: 150 MMHG | RESPIRATION RATE: 16 BRPM | WEIGHT: 210 LBS | OXYGEN SATURATION: 94 % | BODY MASS INDEX: 34.4 KG/M2

## 2024-01-11 DIAGNOSIS — I82.4Z2 DVT, LOWER EXTREMITY, DISTAL, ACUTE, LEFT (HCC): Primary | ICD-10-CM

## 2024-01-11 LAB
ANION GAP SERPL CALC-SCNC: 6 MEQ/L (ref 8–16)
APTT PPP: 28.6 SECONDS (ref 22–38)
BASOPHILS ABSOLUTE: 0 THOU/MM3 (ref 0–0.1)
BASOPHILS NFR BLD AUTO: 0.2 %
BUN SERPL-MCNC: 13 MG/DL (ref 7–22)
CALCIUM SERPL-MCNC: 9.2 MG/DL (ref 8.5–10.5)
CHLORIDE SERPL-SCNC: 102 MEQ/L (ref 98–111)
CO2 SERPL-SCNC: 31 MEQ/L (ref 23–33)
CREAT SERPL-MCNC: 1.1 MG/DL (ref 0.4–1.2)
DEPRECATED RDW RBC AUTO: 50.9 FL (ref 35–45)
EOSINOPHIL NFR BLD AUTO: 1.6 %
EOSINOPHILS ABSOLUTE: 0.2 THOU/MM3 (ref 0–0.4)
ERYTHROCYTE [DISTWIDTH] IN BLOOD BY AUTOMATED COUNT: 14.3 % (ref 11.5–14.5)
FLUAV RNA RESP QL NAA+PROBE: NOT DETECTED
FLUBV RNA RESP QL NAA+PROBE: NOT DETECTED
GFR SERPL CREATININE-BSD FRML MDRD: 55 ML/MIN/1.73M2
GLUCOSE SERPL-MCNC: 119 MG/DL (ref 70–108)
HCT VFR BLD AUTO: 38.6 % (ref 37–47)
HGB BLD-MCNC: 11.6 GM/DL (ref 12–16)
IMM GRANULOCYTES # BLD AUTO: 0.05 THOU/MM3 (ref 0–0.07)
IMM GRANULOCYTES NFR BLD AUTO: 0.4 %
INR PPP: 0.98 (ref 0.85–1.13)
LYMPHOCYTES ABSOLUTE: 2.2 THOU/MM3 (ref 1–4.8)
LYMPHOCYTES NFR BLD AUTO: 16.9 %
MCH RBC QN AUTO: 29 PG (ref 26–33)
MCHC RBC AUTO-ENTMCNC: 30.1 GM/DL (ref 32.2–35.5)
MCV RBC AUTO: 96.5 FL (ref 81–99)
MONOCYTES ABSOLUTE: 0.9 THOU/MM3 (ref 0.4–1.3)
MONOCYTES NFR BLD AUTO: 6.7 %
NEUTROPHILS NFR BLD AUTO: 74.2 %
NRBC BLD AUTO-RTO: 0 /100 WBC
NT-PROBNP SERPL IA-MCNC: 1887 PG/ML (ref 0–124)
OSMOLALITY SERPL CALC.SUM OF ELEC: 278.8 MOSMOL/KG (ref 275–300)
PLATELET # BLD AUTO: 255 THOU/MM3 (ref 130–400)
PMV BLD AUTO: 10 FL (ref 9.4–12.4)
POTASSIUM SERPL-SCNC: 4.2 MEQ/L (ref 3.5–5.2)
RBC # BLD AUTO: 4 MILL/MM3 (ref 4.2–5.4)
SARS-COV-2 RNA RESP QL NAA+PROBE: NOT DETECTED
SEGMENTED NEUTROPHILS ABSOLUTE COUNT: 9.7 THOU/MM3 (ref 1.8–7.7)
SODIUM SERPL-SCNC: 139 MEQ/L (ref 135–145)
TROPONIN, HIGH SENSITIVITY: 12 NG/L (ref 0–12)
TROPONIN, HIGH SENSITIVITY: 14 NG/L (ref 0–12)
WBC # BLD AUTO: 13.1 THOU/MM3 (ref 4.8–10.8)

## 2024-01-11 PROCEDURE — 80048 BASIC METABOLIC PNL TOTAL CA: CPT

## 2024-01-11 PROCEDURE — 93971 EXTREMITY STUDY: CPT

## 2024-01-11 PROCEDURE — 6360000004 HC RX CONTRAST MEDICATION: Performed by: STUDENT IN AN ORGANIZED HEALTH CARE EDUCATION/TRAINING PROGRAM

## 2024-01-11 PROCEDURE — 83880 ASSAY OF NATRIURETIC PEPTIDE: CPT

## 2024-01-11 PROCEDURE — 99285 EMERGENCY DEPT VISIT HI MDM: CPT

## 2024-01-11 PROCEDURE — 71275 CT ANGIOGRAPHY CHEST: CPT

## 2024-01-11 PROCEDURE — 71046 X-RAY EXAM CHEST 2 VIEWS: CPT

## 2024-01-11 PROCEDURE — 84484 ASSAY OF TROPONIN QUANT: CPT

## 2024-01-11 PROCEDURE — 87636 SARSCOV2 & INF A&B AMP PRB: CPT

## 2024-01-11 PROCEDURE — 85025 COMPLETE CBC W/AUTO DIFF WBC: CPT

## 2024-01-11 PROCEDURE — 93005 ELECTROCARDIOGRAM TRACING: CPT | Performed by: EMERGENCY MEDICINE

## 2024-01-11 PROCEDURE — 36415 COLL VENOUS BLD VENIPUNCTURE: CPT

## 2024-01-11 PROCEDURE — 93010 ELECTROCARDIOGRAM REPORT: CPT | Performed by: INTERNAL MEDICINE

## 2024-01-11 PROCEDURE — 85730 THROMBOPLASTIN TIME PARTIAL: CPT

## 2024-01-11 PROCEDURE — 85610 PROTHROMBIN TIME: CPT

## 2024-01-11 RX ADMIN — IOPAMIDOL 80 ML: 755 INJECTION, SOLUTION INTRAVENOUS at 15:29

## 2024-01-11 ASSESSMENT — PAIN - FUNCTIONAL ASSESSMENT: PAIN_FUNCTIONAL_ASSESSMENT: NONE - DENIES PAIN

## 2024-01-11 NOTE — DISCHARGE INSTRUCTIONS
Go to your follow up appointment as scheduled.    Take your medication as indicated.  For pain use ibuprofen (Motrin / Advil) or acetaminophen (Tylenol), unless prescribed medications that have acetaminophen in it.  You can take over the counter acetaminophen tablets (1 - 2 tablets of the 500-mg strength every 6 hours) or ibuprofen tablets (2 tablets every 4 hours).      PLEASE RETURN TO THE EMERGENCY DEPARTMENT IMMEDIATELY for worsening symptoms of increasing pain, shortness of breath, feeling of your heart fluttering or racing, swelling to your feet, unable to lay flat, or if you develop any concerning symptoms such as: high fever not relieved by acetaminophen (Tylenol) and/or ibuprofen (Motrin / Advil), chills, persistent nausea and/or vomiting, loss of consciousness, numbness, weakness or tingling in the arms or legs or change in color of the extremities, changes in mental status, persistent headache, blurry vision, loss of bladder / bowel control, unable to follow up with your physician, or other any other care or concern.

## 2024-01-11 NOTE — ED NOTES
Pt resting quietly on cot in stable condition. Denies any needs at this time. Call light in reach.

## 2024-01-11 NOTE — ED PROVIDER NOTES
[facility]  EMERGENCY MEDICINE ATTENDING ATTESTATION      Evaluation of Meka [place].   Case discussed and care plan developed with resident physician.   I agree with the resident physician documentation and plan as documented by him, except if my documentation differs.   Patient seen, interviewed and examined by me.   I reviewed the medical, surgical, family and social history, medications and allergies.   I have reviewed and interpreted all available lab, radiology and ekg results available at the moment.  I have reviewed the nursing documentation.       Brief H&P   Patient with history of renal cell carcinoma status post partial nephrectomy, complaining of left leg swelling and shortness of breath.    Physical exam is notable for well appearing, mild left lower extremity swelling, nontender.      Medical Decision Making   MDM:   New lower extremity edema  Rule out DVT  Rule out PE  Possible early CHF  Plan:   IV line, labs  EKG  Imaging  Observation in the ED while awaiting results    Please see the resident physician completed note for final disposition except as documented on this attestation.   I have reviewed and interpreted all available lab, radiology and ekg results available at the moment.  Diagnosis, treatment and disposition plans were discussed and agreed upon by patient.   This transcription was electronically signed. It was dictated by use of voice recognition software and electronically transcribed. The transcription may contain errors not detected in proofreading.     I performed direct supervision and was present for the critical portion following procedures: None  Critical care time on this case: None    Electronically signed by Lance Dumont MD on 1/11/24 at 3:33 PM Lance Ortiz MD  01/11/24 1535

## 2024-01-11 NOTE — ED NOTES
Pt to the ED via personal  transport. Pt presents with complaints of having a partial nephrectomy two weeks ago and now has swelling and shortness of breath. Patient states that symptoms began about 4 days ago . EKG completed and IV access obtained. Telemetry applied. Patient is alert and oriented x 4.  Patient provided blanket.  Call light within reach.

## 2024-01-11 NOTE — ED PROVIDER NOTES
[facility]  EMERGENCY DEPARTMENT ENCOUNTER          Pt Name: Meka Bang  MRN: [id number]  Birthdate 1955  Date of evaluation: 2024  Emergency Physician: Wenceslao Flores MD    CHIEF COMPLAINT       Chief Complaint   Patient presents with    Leg Swelling     Left leg     Shortness of Breath    Post-op Problem     History obtained from the patient.      HISTORY OF PRESENT ILLNESS    HPI  Meka Bang is a 68 y.o. female with past medical history of diabetes mellitus, CAD, chronic hypoxic respiratory failure, hypertension, tobacco abuse, renal cell carcinoma of left kidney status post left partial nephrectomy on 23 who presents to the emergency department for evaluation of LLE swelling. Has been going on for last two days. Experiencing bilateral lower extremity swelling but Left worse than right. Reports swelling much improved today compared to yesterday. Reports needing to sleep upright with pillows. No fevers, chills, headaches, shortness of breath at rest, chest pain. Reports some mild dyspnea on exertion.   The patient has no other acute complaints at this time.        REVIEW OF SYSTEMS   Review of Systems    See HPI. 12 point ROS performed, pertinent positives listed above otherwise negative  PAST MEDICAL AND SURGICAL HISTORY     Past Medical History:   Diagnosis Date    Arthritis     Cancer (HCC)     kidney    Chest pain     GERD (gastroesophageal reflux disease)     Kidney stones     MI (myocardial infarction) (HCC)         Migraine     migraines     Past Surgical History:   Procedure Laterality Date    APPENDECTOMY       SECTION      COLONOSCOPY      CT BIOPSY RENAL  11/10/2023    CT BIOPSY RENAL 11/10/2023 [facility] CT SCAN    FINGER TRIGGER RELEASE      JOINT REPLACEMENT Right 2022    KNEE ARTHROSCOPY      PARTIAL NEPHRECTOMY Left 2023    ROBOTIC LAPAROSCOPIC  LEFT PARTIAL NEPHRECTOMY performed by Omar Schwarz,

## 2024-01-11 NOTE — ED NOTES
Pt resting quietly on cot in stable condition. Dr. Flores at bedside. Denies any needs at this time. Call light in reach.

## 2024-01-12 LAB
EKG ATRIAL RATE: 75 BPM
EKG P AXIS: 48 DEGREES
EKG P-R INTERVAL: 154 MS
EKG Q-T INTERVAL: 378 MS
EKG QRS DURATION: 80 MS
EKG QTC CALCULATION (BAZETT): 422 MS
EKG R AXIS: -34 DEGREES
EKG T AXIS: 8 DEGREES
EKG VENTRICULAR RATE: 75 BPM

## 2024-01-16 ENCOUNTER — TELEPHONE (OUTPATIENT)
Dept: UROLOGY | Age: 69
End: 2024-01-16

## 2024-01-16 DIAGNOSIS — G47.30 SLEEP APNEA, UNSPECIFIED TYPE: Primary | ICD-10-CM

## 2024-05-30 ENCOUNTER — TELEPHONE (OUTPATIENT)
Dept: UROLOGY | Age: 69
End: 2024-05-30

## 2024-05-30 ENCOUNTER — OFFICE VISIT (OUTPATIENT)
Dept: UROLOGY | Age: 69
End: 2024-05-30
Payer: MEDICARE

## 2024-05-30 VITALS — RESPIRATION RATE: 14 BRPM | BODY MASS INDEX: 32.78 KG/M2 | WEIGHT: 204 LBS | HEIGHT: 66 IN

## 2024-05-30 DIAGNOSIS — C64.2 RENAL CELL CARCINOMA OF LEFT KIDNEY (HCC): Primary | ICD-10-CM

## 2024-05-30 DIAGNOSIS — N32.81 OAB (OVERACTIVE BLADDER): ICD-10-CM

## 2024-05-30 LAB
BILIRUBIN, URINE: NEGATIVE
BLOOD URINE, POC: ABNORMAL
CHARACTER, URINE: ABNORMAL
COLOR: YELLOW
GLUCOSE URINE: NEGATIVE MG/DL
KETONES, URINE: NEGATIVE
LEUKOCYTE CLUMPS, URINE: ABNORMAL
NITRITE, URINE: NEGATIVE
PH, URINE: 5.5 (ref 5–9)
PROTEIN, URINE: 30 MG/DL
SPECIFIC GRAVITY UA: 1.02 (ref 1–1.03)
UROBILINOGEN, URINE: 0.2 EU/DL (ref 0–1)

## 2024-05-30 PROCEDURE — 99214 OFFICE O/P EST MOD 30 MIN: CPT | Performed by: UROLOGY

## 2024-05-30 PROCEDURE — 1123F ACP DISCUSS/DSCN MKR DOCD: CPT | Performed by: UROLOGY

## 2024-05-30 PROCEDURE — 81003 URINALYSIS AUTO W/O SCOPE: CPT | Performed by: UROLOGY

## 2024-05-30 RX ORDER — AMLODIPINE BESYLATE 5 MG/1
5 TABLET ORAL DAILY
COMMUNITY

## 2024-05-30 RX ORDER — CEPHALEXIN 500 MG/1
500 CAPSULE ORAL 4 TIMES DAILY
COMMUNITY

## 2024-05-30 NOTE — PROGRESS NOTES
(KEFLEX) 500 MG capsule Take 1 capsule by mouth 4 times daily      amLODIPine (NORVASC) 5 MG tablet Take 1 tablet by mouth daily      varenicline (CHANTIX) 0.5 MG tablet Take 1 tablet by mouth 2 times daily      vitamin D (ERGOCALCIFEROL) 1.25 MG (73499 UT) CAPS capsule Take 1 capsule by mouth once a week 12 capsule 3    oxybutynin (DITROPAN-XL) 10 MG extended release tablet Take 1 tablet by mouth daily      aspirin 81 MG EC tablet Take 1 tablet by mouth daily      oxyCODONE-acetaminophen (PERCOCET) 5-325 MG per tablet Take 1 tablet by mouth every 4 hours as needed for Pain. Max Daily Amount: 6 tablets (Patient not taking: Reported on 2024)      baclofen (LIORESAL) 10 MG tablet Take 1 tablet by mouth 3 times daily (Patient not taking: Reported on 2024)       No current facility-administered medications for this visit.       Past Medical History  Meka  has a past medical history of Arthritis, Cancer ([facility]), Chest pain, GERD (gastroesophageal reflux disease), Kidney stones, MI (myocardial infarction) ([facility]), and Migraine.    Past Surgical History  The patient  has a past surgical history that includes Appendectomy;  section; Finger trigger release; Knee arthroscopy; Upper gastrointestinal endoscopy; Colonoscopy; CT BIOPSY RENAL (11/10/2023); joint replacement (Right, 2022); and partial nephrectomy (Left, 2023).    Family History  This patient's family history includes Breast Cancer in her maternal grandmother; Cancer in her mother and another family member; Other in her father and mother.    Social History  Meka  reports that she has been smoking cigarettes. She started smoking about 50 years ago. She has a 50.4 pack-year smoking history. She has been exposed to tobacco smoke. She has never used smokeless tobacco. She reports current alcohol use. She reports that she does not use drugs.      Subjective:      REVIEW OF SYSTEMS:  Constitutional: negative  Eyes: negative  Respiratory:

## 2024-05-31 ENCOUNTER — TELEPHONE (OUTPATIENT)
Dept: UROLOGY | Age: 69
End: 2024-05-31

## 2024-05-31 DIAGNOSIS — C64.2 RENAL CELL CARCINOMA OF LEFT KIDNEY (HCC): Primary | ICD-10-CM

## 2024-05-31 NOTE — TELEPHONE ENCOUNTER
Auth # for CT Scan  A [id number]  Good from 5/31/24 to 11/27/24 . Patient to schedule at [facility]  prior to her follow up

## 2024-06-12 LAB
CREAT SERPL-MCNC: 1.5 MG/DL (ref 0.6–1.3)
CREATININE CLEARANCE: 42

## 2024-06-14 ENCOUNTER — HOSPITAL ENCOUNTER (OUTPATIENT)
Dept: CT IMAGING | Age: 69
Discharge: HOME | End: 2024-06-14
Attending: RADIOLOGY

## 2024-06-14 DIAGNOSIS — Z00.6 EXAMINATION FOR NORMAL COMPARISON FOR CLINICAL RESEARCH: ICD-10-CM

## 2024-11-06 ENCOUNTER — TELEPHONE (OUTPATIENT)
Dept: UROLOGY | Age: 69
End: 2024-11-06

## 2024-11-07 RX ORDER — ERGOCALCIFEROL 1.25 MG/1
50000 CAPSULE, LIQUID FILLED ORAL WEEKLY
Qty: 12 CAPSULE | Refills: 0 | Status: SHIPPED | OUTPATIENT
Start: 2024-11-07

## 2024-11-26 ENCOUNTER — TELEPHONE (OUTPATIENT)
Dept: UROLOGY | Age: 69
End: 2024-11-26

## 2024-11-30 ENCOUNTER — HOSPITAL ENCOUNTER (OUTPATIENT)
Dept: CT IMAGING | Age: 69
Discharge: HOME | End: 2024-11-30
Attending: RADIOLOGY

## 2024-11-30 DIAGNOSIS — Z00.6 ENCOUNTER FOR EXAMINATION FOR NORMAL COMPARISON OR CONTROL IN CLINICAL RESEARCH PROGRAM: ICD-10-CM

## 2024-12-19 ENCOUNTER — OFFICE VISIT (OUTPATIENT)
Dept: UROLOGY | Age: 69
End: 2024-12-19
Payer: MEDICARE

## 2024-12-19 VITALS — WEIGHT: 193 LBS | RESPIRATION RATE: 20 BRPM | BODY MASS INDEX: 31.02 KG/M2 | HEIGHT: 66 IN

## 2024-12-19 DIAGNOSIS — N32.81 OAB (OVERACTIVE BLADDER): ICD-10-CM

## 2024-12-19 DIAGNOSIS — C64.2 RENAL CELL CARCINOMA OF LEFT KIDNEY (HCC): Primary | ICD-10-CM

## 2024-12-19 PROCEDURE — 1159F MED LIST DOCD IN RCRD: CPT | Performed by: NURSE PRACTITIONER

## 2024-12-19 PROCEDURE — 1123F ACP DISCUSS/DSCN MKR DOCD: CPT | Performed by: NURSE PRACTITIONER

## 2024-12-19 PROCEDURE — 99213 OFFICE O/P EST LOW 20 MIN: CPT | Performed by: NURSE PRACTITIONER

## 2024-12-19 NOTE — PROGRESS NOTES
Medication list not available to review.   
AM       Radiology  The patient has had a CT Without  Contrast which I have independently reviewed along with its accompanying report.  The study demonstrates:    FINDINGS:        LUNG BASES:  Unremarkable.  No mass.  No consolidation.        PLEURAL SPACE:  Unremarkable.  No significant effusion.  No pneumothorax.        ABDOMEN:        LIVER:  Simple left hepatic remain stable. No required imaging follow-up needed given high   likelihood of benign nature.        GALLBLADDER AND BILE DUCTS:  Unremarkable.  No calcified stones.  No ductal dilation.        PANCREAS:  Unremarkable.  No ductal dilation.        SPLEEN:  Unremarkable.  No splenomegaly.        ADRENALS:  Unremarkable.  No mass.        KIDNEYS AND URETERS:  Nonobstructing right renal calculus. Postoperative changes of the   inferior left kidney compatible with partial nephrectomy. This is evident on prior CT are   not well evaluated given lack of IV contrast.        STOMACH AND BOWEL:  Diverticulosis of the colon without evidence of diverticulitis.        PELVIS:        APPENDIX: Appendectomy.        BLADDER:  Urinary bladder is nondistended.  No stones.        REPRODUCTIVE:  Unremarkable as visualized.        ABDOMEN and PELVIS:        INTRAPERITONEAL SPACE:  Unremarkable.  No free air.  No significant fluid collection.        BONES/JOINTS:  Right hip replacement.  Degenerative changes of the lumbar spine.        SOFT TISSUES:  Unremarkable.        VASCULATURE:  Unremarkable.  No abdominal aortic aneurysm.        LYMPH NODES:  Unremarkable.  No enlarged lymph nodes.        IMPRESSION:             Partial nephrectomy of the inferior left kidney. No hydronephrosis. No obvious enlarging   mass; evaluation is limited without IV contrast.       Assessment/Plan:   1. Renal cell carcinoma of left kidney (HCC)  - s/p robotic laparoscopic left partial nephrectomy  - Chromophobe renal cell carcinoma, pT3a   - CT negative for mets  - XR CHEST STANDARD (2 VW);

## 2025-01-30 RX ORDER — ERGOCALCIFEROL 1.25 MG/1
50000 CAPSULE, LIQUID FILLED ORAL WEEKLY
Qty: 12 CAPSULE | Refills: 0 | OUTPATIENT
Start: 2025-01-30

## 2025-04-02 ENCOUNTER — TELEPHONE (OUTPATIENT)
Dept: UROLOGY | Age: 70
End: 2025-04-02

## 2025-05-16 ENCOUNTER — TELEPHONE (OUTPATIENT)
Dept: UROLOGY | Age: 70
End: 2025-05-16

## 2025-06-20 ENCOUNTER — OFFICE VISIT (OUTPATIENT)
Dept: UROLOGY | Age: 70
End: 2025-06-20
Payer: MEDICARE

## 2025-06-20 VITALS — BODY MASS INDEX: 30.53 KG/M2 | WEIGHT: 190 LBS | RESPIRATION RATE: 18 BRPM | HEIGHT: 66 IN

## 2025-06-20 DIAGNOSIS — N32.81 OAB (OVERACTIVE BLADDER): ICD-10-CM

## 2025-06-20 DIAGNOSIS — C64.2 RENAL CELL CARCINOMA OF LEFT KIDNEY (HCC): Primary | ICD-10-CM

## 2025-06-20 DIAGNOSIS — N95.2 VAGINAL ATROPHY: ICD-10-CM

## 2025-06-20 PROCEDURE — 99214 OFFICE O/P EST MOD 30 MIN: CPT | Performed by: NURSE PRACTITIONER

## 2025-06-20 PROCEDURE — 1123F ACP DISCUSS/DSCN MKR DOCD: CPT | Performed by: NURSE PRACTITIONER

## 2025-06-20 PROCEDURE — 1159F MED LIST DOCD IN RCRD: CPT | Performed by: NURSE PRACTITIONER

## 2025-06-20 RX ORDER — METHIMAZOLE 5 MG/1
5 TABLET ORAL DAILY
COMMUNITY
Start: 2025-06-13

## 2025-06-20 RX ORDER — ROSUVASTATIN CALCIUM 5 MG/1
5 TABLET, COATED ORAL DAILY
COMMUNITY
Start: 2025-06-17

## 2025-06-20 RX ORDER — ESTRADIOL 0.1 MG/G
2 CREAM VAGINAL DAILY
Qty: 42.5 G | Refills: 3 | Status: SHIPPED | OUTPATIENT
Start: 2025-06-20 | End: 2025-07-20

## 2025-06-20 RX ORDER — MIRABEGRON 50 MG/1
50 TABLET, FILM COATED, EXTENDED RELEASE ORAL DAILY
Qty: 30 TABLET | Refills: 3 | Status: SHIPPED | OUTPATIENT
Start: 2025-06-20 | End: 2025-07-20

## 2025-06-20 NOTE — PROGRESS NOTES
[facility] [place] [facility]  [facility]  [address]  [address]  [facility] [address]  Dept: [phone]  Loc: [phone]    Visit Date: 6/20/2025        HPI:     Meka Bang is a 70 y.o. female who presents today for:  Chief Complaint   Patient presents with    Results     Scans       HPI  Patient presents to urology clinic for follow-up.      Meka underwent robotic laparoscopic left partial nephrectomy by Dr. Schwarz on 12/29/2023.  She has been trying to quit smoking. She reports doing well. Denies flank pain, suprapubic pressure, gross hematuria, dysuria, fever or chills.   Here today for follow-up surveillance imaging.     She reports vaginal dryness.     FINAL DIAGNOSIS:   Left kidney mass, partial nephrectomy:    Chromophobe renal cell carcinoma.    Invades perirenal fat.    Margins free.   pT3a         Left renal mass  Reviewed MRI and CT report  No images seen  4.5 cm enhancing renal mass  Possibly seen in 2020     OAB  On ditropan, no longer working    Current Outpatient Medications   Medication Sig Dispense Refill    methIMAzole (TAPAZOLE) 5 MG tablet Take 1 tablet by mouth daily      rosuvastatin (CRESTOR) 5 MG tablet Take 1 tablet by mouth daily      estradiol (ESTRACE VAGINAL) 0.1 MG/GM vaginal cream Place 2 g vaginally daily Place 2g vaginally daily for 2 weeks, thereafter place two times weekly. 42.5 g 3    mirabegron (MYRBETRIQ) 50 MG TB24 Take 50 mg by mouth daily 30 tablet 3    amLODIPine (NORVASC) 5 MG tablet Take 1 tablet by mouth daily      aspirin 81 MG EC tablet Take 1 tablet by mouth daily      vitamin D (ERGOCALCIFEROL) 1.25 MG (52365 UT) CAPS capsule Take 1 capsule by mouth once a week (Patient not taking: Reported on 6/20/2025) 12 capsule 0    cephALEXin (KEFLEX) 500 MG capsule Take 1 capsule by mouth 4 times daily (Patient not taking: Reported on 6/20/2025)      varenicline (CHANTIX) 0.5 MG tablet Take 1 tablet by mouth 2 times daily

## 2025-06-20 NOTE — PATIENT INSTRUCTIONS
Stop oxybutynin and switch to mirabegron for bladder symptoms.     Use estrogen cream for vaginal dryness.    Call sooner with any questions or concerns.

## (undated) DEVICE — SUTURE MCRYL SZ 4-0 L27IN ABSRB UD L19MM PS-2 1/2 CIR PRIM Y426H

## (undated) DEVICE — PACK PROCEDURE SURG ROBOTIC KID SVMMC

## (undated) DEVICE — INSUFFLATION NEEDLE TO ESTABLISH PNEUMOPERITONEUM.: Brand: INSUFFLATION NEEDLE

## (undated) DEVICE — COLUMN DRAPE

## (undated) DEVICE — SUTURE V-LOC 180 SZ 3-0 L6IN ABSRB GRN V-20 L26MM 1/2 CIR VLOCL0604

## (undated) DEVICE — BLADE,CARBON-STEEL,15,STRL,DISPOSABLE,TB: Brand: MEDLINE

## (undated) DEVICE — APPLIER SUT CLP FOR 2-0 3-0 4-0 VCRL ABSRB SUT

## (undated) DEVICE — SUTURE VCRL + SZ 0 L18IN ABSRB UD L36MM CT-1 1/2 CIR VCP840D

## (undated) DEVICE — ELECTRO LUBE IS A SINGLE PATIENT USE DEVICE THAT IS INTENDED TO BE USED ON ELECTROSURGICAL ELECTRODES TO REDUCE STICKING.: Brand: KEY SURGICAL ELECTRO LUBE

## (undated) DEVICE — AGENT HEMSTAT 3GM OXIDIZED REGENERATED CELOS ABSRB FOR CONT (ORDER MULTIPLES OF 5EA)

## (undated) DEVICE — AIRSEAL 12 MM ACCESS PORT AND PALM GRIP OBTURATOR WITH BLADELESS OPTICAL TIP, 120 MM LENGTH: Brand: AIRSEAL

## (undated) DEVICE — SEAL

## (undated) DEVICE — TROCAR: Brand: KII FIOS FIRST ENTRY

## (undated) DEVICE — ARM DRAPE

## (undated) DEVICE — LIQUIBAND RAPID ADHESIVE 36/CS 0.8ML: Brand: MEDLINE

## (undated) DEVICE — CONTAINER,SPECIMEN,PNEU TUBE,4OZ,OR STRL: Brand: MEDLINE

## (undated) DEVICE — SURGICEL ENDOSCP APPL

## (undated) DEVICE — BLADELESS OBTURATOR: Brand: WECK VISTA

## (undated) DEVICE — SUTURE VCRL + SZ 2-0 L27IN ABSRB WHT SH 1/2 CIR TAPERCUT VCP417H

## (undated) DEVICE — SOLUTION ANTIFOG VIS SYS CLEARIFY LAPSCP

## (undated) DEVICE — SEALANT TISS 10 ML FIBRIN VISTASEAL

## (undated) DEVICE — 3M™ IOBAN™ 2 ANTIMICROBIAL INCISE DRAPE 6650EZ: Brand: IOBAN™ 2

## (undated) DEVICE — APPLICATOR LAP 45CM FLX 2 VISTASEAL

## (undated) DEVICE — GLOVE ORANGE PI 7 1/2   MSG9075

## (undated) DEVICE — SUTURE VCRL + SZ 0 L18IN ABSRB TIE VCP106G

## (undated) DEVICE — AGENT HEMSTAT W3XL4IN OXIDIZED REGENERATED CELOS ABSRB FOR

## (undated) DEVICE — SUTURE VCRL + 1 L27IN ABSRB UD CT-1 L36MM 1/2 CIR TAPR PNT VCP261H

## (undated) DEVICE — PACK-MAJOR

## (undated) DEVICE — TRI-LUMEN FILTERED TUBE SET WITH ACTIVATED CHARCOAL FILTER: Brand: AIRSEAL

## (undated) DEVICE — TIP COVER ACCESSORY